# Patient Record
Sex: FEMALE | Race: BLACK OR AFRICAN AMERICAN | NOT HISPANIC OR LATINO | URBAN - METROPOLITAN AREA
[De-identification: names, ages, dates, MRNs, and addresses within clinical notes are randomized per-mention and may not be internally consistent; named-entity substitution may affect disease eponyms.]

---

## 2023-10-08 ENCOUNTER — INPATIENT (INPATIENT)
Facility: HOSPITAL | Age: 68
LOS: 3 days | Discharge: HOME | DRG: 871 | End: 2023-10-12
Attending: GENERAL ACUTE CARE HOSPITAL | Admitting: INTERNAL MEDICINE
Payer: MEDICARE

## 2023-10-08 VITALS
RESPIRATION RATE: 26 BRPM | HEART RATE: 117 BPM | SYSTOLIC BLOOD PRESSURE: 174 MMHG | TEMPERATURE: 98 F | WEIGHT: 173.06 LBS | OXYGEN SATURATION: 90 % | DIASTOLIC BLOOD PRESSURE: 110 MMHG

## 2023-10-08 DIAGNOSIS — E03.9 HYPOTHYROIDISM, UNSPECIFIED: ICD-10-CM

## 2023-10-08 DIAGNOSIS — J44.9 CHRONIC OBSTRUCTIVE PULMONARY DISEASE, UNSPECIFIED: ICD-10-CM

## 2023-10-08 DIAGNOSIS — J45.901 UNSPECIFIED ASTHMA WITH (ACUTE) EXACERBATION: ICD-10-CM

## 2023-10-08 DIAGNOSIS — Z29.9 ENCOUNTER FOR PROPHYLACTIC MEASURES, UNSPECIFIED: ICD-10-CM

## 2023-10-08 DIAGNOSIS — I10 ESSENTIAL (PRIMARY) HYPERTENSION: ICD-10-CM

## 2023-10-08 DIAGNOSIS — F17.200 NICOTINE DEPENDENCE, UNSPECIFIED, UNCOMPLICATED: ICD-10-CM

## 2023-10-08 DIAGNOSIS — J06.9 ACUTE UPPER RESPIRATORY INFECTION, UNSPECIFIED: ICD-10-CM

## 2023-10-08 LAB
ANION GAP SERPL CALC-SCNC: 14 MMOL/L — SIGNIFICANT CHANGE UP (ref 5–17)
BASE EXCESS BLDV CALC-SCNC: -0.6 MMOL/L — SIGNIFICANT CHANGE UP (ref -2–3)
BUN SERPL-MCNC: 6 MG/DL — LOW (ref 7–23)
CA-I SERPL-SCNC: 1.09 MMOL/L — LOW (ref 1.15–1.33)
CALCIUM SERPL-MCNC: 9.7 MG/DL — SIGNIFICANT CHANGE UP (ref 8.4–10.5)
CHLORIDE SERPL-SCNC: 103 MMOL/L — SIGNIFICANT CHANGE UP (ref 96–108)
CO2 BLDV-SCNC: 24.3 MMOL/L — SIGNIFICANT CHANGE UP (ref 22–26)
CO2 SERPL-SCNC: 22 MMOL/L — SIGNIFICANT CHANGE UP (ref 22–31)
CREAT SERPL-MCNC: 0.87 MG/DL — SIGNIFICANT CHANGE UP (ref 0.5–1.3)
EGFR: 74 ML/MIN/1.73M2 — SIGNIFICANT CHANGE UP
GAS PNL BLDV: 134 MMOL/L — LOW (ref 136–145)
GAS PNL BLDV: SIGNIFICANT CHANGE UP
GLUCOSE BLDC GLUCOMTR-MCNC: 374 MG/DL — HIGH (ref 70–99)
GLUCOSE SERPL-MCNC: 140 MG/DL — HIGH (ref 70–99)
HCO3 BLDV-SCNC: 23 MMOL/L — SIGNIFICANT CHANGE UP (ref 22–29)
HCT VFR BLD CALC: 45.5 % — HIGH (ref 34.5–45)
HGB BLD-MCNC: 15.1 G/DL — SIGNIFICANT CHANGE UP (ref 11.5–15.5)
MCHC RBC-ENTMCNC: 31.3 PG — SIGNIFICANT CHANGE UP (ref 27–34)
MCHC RBC-ENTMCNC: 33.2 GM/DL — SIGNIFICANT CHANGE UP (ref 32–36)
MCV RBC AUTO: 94.2 FL — SIGNIFICANT CHANGE UP (ref 80–100)
NRBC # BLD: 0 /100 WBCS — SIGNIFICANT CHANGE UP (ref 0–0)
PCO2 BLDV: 35 MMHG — LOW (ref 39–42)
PH BLDV: 7.43 — SIGNIFICANT CHANGE UP (ref 7.32–7.43)
PLATELET # BLD AUTO: 273 K/UL — SIGNIFICANT CHANGE UP (ref 150–400)
PO2 BLDV: 59 MMHG — HIGH (ref 25–45)
POTASSIUM BLDV-SCNC: 5.1 MMOL/L — SIGNIFICANT CHANGE UP (ref 3.5–5.1)
POTASSIUM SERPL-MCNC: 4.4 MMOL/L — SIGNIFICANT CHANGE UP (ref 3.5–5.3)
POTASSIUM SERPL-SCNC: 4.4 MMOL/L — SIGNIFICANT CHANGE UP (ref 3.5–5.3)
RAPID RVP RESULT: DETECTED
RBC # BLD: 4.83 M/UL — SIGNIFICANT CHANGE UP (ref 3.8–5.2)
RBC # FLD: 13.8 % — SIGNIFICANT CHANGE UP (ref 10.3–14.5)
RV+EV RNA SPEC QL NAA+PROBE: DETECTED
SAO2 % BLDV: 91.4 % — HIGH (ref 67–88)
SARS-COV-2 RNA SPEC QL NAA+PROBE: SIGNIFICANT CHANGE UP
SODIUM SERPL-SCNC: 139 MMOL/L — SIGNIFICANT CHANGE UP (ref 135–145)
WBC # BLD: 6.53 K/UL — SIGNIFICANT CHANGE UP (ref 3.8–10.5)
WBC # FLD AUTO: 6.53 K/UL — SIGNIFICANT CHANGE UP (ref 3.8–10.5)

## 2023-10-08 PROCEDURE — 99291 CRITICAL CARE FIRST HOUR: CPT

## 2023-10-08 PROCEDURE — 71045 X-RAY EXAM CHEST 1 VIEW: CPT | Mod: 26

## 2023-10-08 PROCEDURE — 99222 1ST HOSP IP/OBS MODERATE 55: CPT | Mod: GC

## 2023-10-08 RX ORDER — MAGNESIUM SULFATE 500 MG/ML
2 VIAL (ML) INJECTION ONCE
Refills: 0 | Status: DISCONTINUED | OUTPATIENT
Start: 2023-10-08 | End: 2023-10-08

## 2023-10-08 RX ORDER — ONDANSETRON 8 MG/1
4 TABLET, FILM COATED ORAL EVERY 8 HOURS
Refills: 0 | Status: DISCONTINUED | OUTPATIENT
Start: 2023-10-08 | End: 2023-10-12

## 2023-10-08 RX ORDER — ALBUTEROL 90 UG/1
3 AEROSOL, METERED ORAL
Refills: 0 | DISCHARGE

## 2023-10-08 RX ORDER — ALBUTEROL 90 UG/1
2.5 AEROSOL, METERED ORAL
Refills: 0 | Status: COMPLETED | OUTPATIENT
Start: 2023-10-08 | End: 2023-10-08

## 2023-10-08 RX ORDER — BUDESONIDE AND FORMOTEROL FUMARATE DIHYDRATE 160; 4.5 UG/1; UG/1
2 AEROSOL RESPIRATORY (INHALATION)
Refills: 0 | Status: DISCONTINUED | OUTPATIENT
Start: 2023-10-08 | End: 2023-10-12

## 2023-10-08 RX ORDER — AMLODIPINE BESYLATE 2.5 MG/1
10 TABLET ORAL ONCE
Refills: 0 | Status: COMPLETED | OUTPATIENT
Start: 2023-10-08 | End: 2023-10-08

## 2023-10-08 RX ORDER — ALBUTEROL 90 UG/1
2 AEROSOL, METERED ORAL
Refills: 0 | DISCHARGE

## 2023-10-08 RX ORDER — AMLODIPINE BESYLATE 2.5 MG/1
10 TABLET ORAL DAILY
Refills: 0 | Status: DISCONTINUED | OUTPATIENT
Start: 2023-10-09 | End: 2023-10-12

## 2023-10-08 RX ORDER — LORATADINE 10 MG/1
1 TABLET ORAL
Refills: 0 | DISCHARGE

## 2023-10-08 RX ORDER — PANTOPRAZOLE SODIUM 20 MG/1
40 TABLET, DELAYED RELEASE ORAL
Refills: 0 | Status: DISCONTINUED | OUTPATIENT
Start: 2023-10-08 | End: 2023-10-12

## 2023-10-08 RX ORDER — FLUTICASONE PROPIONATE AND SALMETEROL 50; 250 UG/1; UG/1
1 POWDER ORAL; RESPIRATORY (INHALATION)
Refills: 0 | DISCHARGE

## 2023-10-08 RX ORDER — ATORVASTATIN CALCIUM 80 MG/1
1 TABLET, FILM COATED ORAL
Refills: 0 | DISCHARGE

## 2023-10-08 RX ORDER — ATORVASTATIN CALCIUM 80 MG/1
40 TABLET, FILM COATED ORAL AT BEDTIME
Refills: 0 | Status: DISCONTINUED | OUTPATIENT
Start: 2023-10-08 | End: 2023-10-12

## 2023-10-08 RX ORDER — IPRATROPIUM/ALBUTEROL SULFATE 18-103MCG
3 AEROSOL WITH ADAPTER (GRAM) INHALATION
Refills: 0 | Status: COMPLETED | OUTPATIENT
Start: 2023-10-08 | End: 2023-10-08

## 2023-10-08 RX ORDER — AMLODIPINE BESYLATE 2.5 MG/1
1 TABLET ORAL
Refills: 0 | DISCHARGE

## 2023-10-08 RX ORDER — ACETAMINOPHEN 500 MG
650 TABLET ORAL EVERY 6 HOURS
Refills: 0 | Status: DISCONTINUED | OUTPATIENT
Start: 2023-10-08 | End: 2023-10-12

## 2023-10-08 RX ORDER — LEVOTHYROXINE SODIUM 125 MCG
75 TABLET ORAL DAILY
Refills: 0 | Status: DISCONTINUED | OUTPATIENT
Start: 2023-10-08 | End: 2023-10-12

## 2023-10-08 RX ORDER — IPRATROPIUM/ALBUTEROL SULFATE 18-103MCG
3 AEROSOL WITH ADAPTER (GRAM) INHALATION EVERY 4 HOURS
Refills: 0 | Status: DISCONTINUED | OUTPATIENT
Start: 2023-10-08 | End: 2023-10-11

## 2023-10-08 RX ORDER — INSULIN LISPRO 100/ML
VIAL (ML) SUBCUTANEOUS
Refills: 0 | Status: DISCONTINUED | OUTPATIENT
Start: 2023-10-08 | End: 2023-10-12

## 2023-10-08 RX ORDER — HEPARIN SODIUM 5000 [USP'U]/ML
5000 INJECTION INTRAVENOUS; SUBCUTANEOUS EVERY 8 HOURS
Refills: 0 | Status: DISCONTINUED | OUTPATIENT
Start: 2023-10-08 | End: 2023-10-08

## 2023-10-08 RX ORDER — ENOXAPARIN SODIUM 100 MG/ML
40 INJECTION SUBCUTANEOUS EVERY 24 HOURS
Refills: 0 | Status: DISCONTINUED | OUTPATIENT
Start: 2023-10-08 | End: 2023-10-12

## 2023-10-08 RX ORDER — ASPIRIN/CALCIUM CARB/MAGNESIUM 324 MG
1 TABLET ORAL
Refills: 0 | DISCHARGE

## 2023-10-08 RX ORDER — INSULIN LISPRO 100/ML
VIAL (ML) SUBCUTANEOUS AT BEDTIME
Refills: 0 | Status: DISCONTINUED | OUTPATIENT
Start: 2023-10-08 | End: 2023-10-12

## 2023-10-08 RX ORDER — CYCLOBENZAPRINE HYDROCHLORIDE 10 MG/1
1 TABLET, FILM COATED ORAL
Refills: 0 | DISCHARGE

## 2023-10-08 RX ORDER — LANOLIN ALCOHOL/MO/W.PET/CERES
3 CREAM (GRAM) TOPICAL AT BEDTIME
Refills: 0 | Status: DISCONTINUED | OUTPATIENT
Start: 2023-10-08 | End: 2023-10-12

## 2023-10-08 RX ORDER — OMEPRAZOLE 10 MG/1
1 CAPSULE, DELAYED RELEASE ORAL
Refills: 0 | DISCHARGE

## 2023-10-08 RX ORDER — MAGNESIUM SULFATE 500 MG/ML
2 VIAL (ML) INJECTION ONCE
Refills: 0 | Status: COMPLETED | OUTPATIENT
Start: 2023-10-08 | End: 2023-10-08

## 2023-10-08 RX ORDER — LEVOTHYROXINE SODIUM 125 MCG
1 TABLET ORAL
Refills: 0 | DISCHARGE

## 2023-10-08 RX ADMIN — ALBUTEROL 2.5 MILLIGRAM(S): 90 AEROSOL, METERED ORAL at 17:49

## 2023-10-08 RX ADMIN — Medication 3 MILLILITER(S): at 15:07

## 2023-10-08 RX ADMIN — ALBUTEROL 2.5 MILLIGRAM(S): 90 AEROSOL, METERED ORAL at 16:27

## 2023-10-08 RX ADMIN — Medication 3 MILLILITER(S): at 22:34

## 2023-10-08 RX ADMIN — AMLODIPINE BESYLATE 10 MILLIGRAM(S): 2.5 TABLET ORAL at 17:53

## 2023-10-08 RX ADMIN — Medication 2 GRAM(S): at 16:43

## 2023-10-08 RX ADMIN — Medication 150 GRAM(S): at 15:19

## 2023-10-08 RX ADMIN — Medication 3 MILLILITER(S): at 19:34

## 2023-10-08 RX ADMIN — ALBUTEROL 2.5 MILLIGRAM(S): 90 AEROSOL, METERED ORAL at 17:55

## 2023-10-08 RX ADMIN — ATORVASTATIN CALCIUM 40 MILLIGRAM(S): 80 TABLET, FILM COATED ORAL at 22:35

## 2023-10-08 RX ADMIN — Medication 3 MILLILITER(S): at 15:05

## 2023-10-08 RX ADMIN — ENOXAPARIN SODIUM 40 MILLIGRAM(S): 100 INJECTION SUBCUTANEOUS at 22:35

## 2023-10-08 RX ADMIN — ALBUTEROL 2.5 MILLIGRAM(S): 90 AEROSOL, METERED ORAL at 18:30

## 2023-10-08 RX ADMIN — ALBUTEROL 2.5 MILLIGRAM(S): 90 AEROSOL, METERED ORAL at 16:43

## 2023-10-08 RX ADMIN — ALBUTEROL 2.5 MILLIGRAM(S): 90 AEROSOL, METERED ORAL at 16:26

## 2023-10-08 RX ADMIN — Medication 125 MILLIGRAM(S): at 15:08

## 2023-10-08 RX ADMIN — Medication 6: at 23:15

## 2023-10-08 NOTE — ED PROVIDER NOTE - PROGRESS NOTE DETAILS
Pt w some improvement in bs after meds, dec wob w bipap, sat 100%.  CXR neg, labs wnl.  RVP pending.  ICU consulted for poss step down bed.  Will cont to monitor.  Plan for admit. Pt off bipap, cont wheezing but decreased wob, sat 95% ra.  Pt c/o chest tightness, improving after additional neb.  Pt did not take her meds x 2 d 2/2 feeling unwell.  Amlodipine 10 ordered for pt.  ICU dispo pending. Pt accepted for step down.

## 2023-10-08 NOTE — H&P ADULT - ASSESSMENT
67F PMHx asthma (no intubations, last admit many yrs ago, last steroids 2 yr ago), lung nodules, active smoker, HTN, HLD, Hypothyroid p/w 3d hx sob/wheezing/dry cough/rhinorrhea/nasal congestion iso asthma exacerbation 2/2 viral respiratory infection

## 2023-10-08 NOTE — ED PROVIDER NOTE - PHYSICAL EXAMINATION
VITAL SIGNS: I have reviewed nursing notes and confirm.  CONSTITUTIONAL:  in resp distress.  speaking full sentences, audible wheezing, + inc wob, tachypnea  SKIN:  warm and dry, no acute rash.   HEAD:  normocephalic, atraumatic.  EYES: PERRL, EOM intact; conjunctiva and sclera clear.  ENT: No nasal discharge; airway clear.   NECK: Supple; non tender.  CARD: S1, S2 normal; no murmurs, gallops, or rubs. Rapid, regular rate and rhythm.   RESP:  diffuse wheezes, distant bs w poor air movement  ABD: Normal bowel sounds; soft; non-distended; non-tender; no guarding/ rebound.  MSK: Normal ROM. No clubbing, cyanosis or edema. no ttp bilat le  NEURO: Alert, oriented, grossly unremarkable  PSYCH: Cooperative, mood and affect appropriate.

## 2023-10-08 NOTE — ED ADULT NURSE NOTE - OBJECTIVE STATEMENT
BIBEMS from  for c/o sob+wheezing x 3 days, with rhinorrhea+cough, pmh asthma, no releif with at home nebs, similar symptoms in grandson. Denies CP/weakness/dizziness/fevers/chills.     On assessment- AOx4,, VS- tachy and hypoxic and tachypneic, able to speak in clear coherent sentences despite VS with sig acessory muscle use, apparent steady gait unassisted, neuro intact with no apparent facial asymmetry, PERRLA.    Pt upgraded from triage with immediate ED RN and MD team at bedside. Placed on CM, EKG obtained, PIV established with labs sent. Medicated per verbal orders as reflected in MAR. Pt remains on CM, resting on stretcher, stable. BIPAP applied by resp team

## 2023-10-08 NOTE — ED PROVIDER NOTE - CARE PLAN
Principal Discharge DX:	Acute asthma exacerbation   1 Principal Discharge DX:	Acute asthma exacerbation  Secondary Diagnosis:	Enterovirus infection  Secondary Diagnosis:	Rhinovirus infection

## 2023-10-08 NOTE — H&P ADULT - ATTENDING COMMENTS
Asthma, HTN, smoker with acute asthma exacerbation with rhinovirus  physical as above  still dyspneic off BIPAP after steroids and bronchodilators  admit to telemetry  continue steroids and bronchodilators  NIV on standby  continue amlodipine

## 2023-10-08 NOTE — H&P ADULT - PROBLEM SELECTOR PLAN 6
1/2 PPD since 18 y/o, total of 48 pack year smoking hx    - Pt refusing nicotine replacement therapy at this time

## 2023-10-08 NOTE — H&P ADULT - PROBLEM SELECTOR PLAN 1
#COPD exacerbation  No intubations, last admit many yrs ago, last steroids 2 yr ago, home med Advair-diskus 500-50 Q12 and Albuterol inhaler PRN. P/w 3 d hx sob/wheezing/dry cough/rhinorrhea/nasal congestion 2/2 viral respiratory infection. Solumedrol, duonebs in ED    - Duonebs Q4 standing  - C/w home advair disku 500-50 Q12  - Prednisone 40 Qd for 5 day course  - mISS and PPI while on Pred  - If WOB increases and pt desaturates, place pt back on Bipap #COPD exacerbation  No intubations, last admit many yrs ago, last steroids 2 yr ago, home med Advair-diskus 500-50 Q12 and Albuterol inhaler PRN. P/w 3 d hx sob/wheezing/dry cough/rhinorrhea/nasal congestion 2/2 viral respiratory infection. Solumedrol, duonebs in ED    - Duonebs Q4 standing  - C/w home advair disku 500-50 Q12 (Therapeutic exchange)  - Prednisone 40 Qd for 5 day course  - mISS and PPI while on Pred  - If WOB increases and pt desaturates, place pt back on Bipap

## 2023-10-08 NOTE — H&P ADULT - HISTORY OF PRESENT ILLNESS
67F PMHx asthma (no intubations, last admit many yrs ago, last steroids 2 yr ago), lung nodules, active smoker, HTN, HLD, Hypothyroid p/w 3d hx sob/wheezing/dry cough/rhinorrhea/nasal congestion, pt was sent from urgent care for resp distress and ra sat 87%.  Pt given 1 duoneb and 40 mg prednisone and has been using home mdi and neb machine w/o relief.  Grandson sick w similar sx, potentailly viral etiology.   Pt reports hx covid 2 mo ago, sx improved. Pt also reports chest tightness similar to prior asthma. No recent travel, sick contact of shant as above.     ED Vitals: T98.2F, , /110, SpO2 90 on RA with RR 26 --> SpO2  ED Labs: WBC 6.5, Hgb 15, Plt 273, CMP WNL except glucose 140. . VBG pCO2 low at 35, PO2 high at 59.   ED studies: RVP positive for rhinovirus. Covid negative. CXR performed pending read, no overt consolidations noted by author.   ED interventions: Amlodipine 10 x1, Mag sulfate 2g x1, Solumedrol 125 IVP x1, Duoneb x1, Albulterol neb x2. Initially placed on bipap for iWOB, weaned to NC then room air. Saturating well, no iWOB however still wheezing.

## 2023-10-08 NOTE — ED ADULT NURSE NOTE - CHIEF COMPLAINT QUOTE
" I have been sick for the last 3 days." +wheezing, +SOB. +CP +cough +decreased PO + nausea Denies fever. Hx COPD and asthma. pt reports taking albuterol tx at home w no relief. pt was brought by EMS from [facility] where she received prednisone and albuterol treatment.

## 2023-10-08 NOTE — ED PROVIDER NOTE - CLINICAL SUMMARY MEDICAL DECISION MAKING FREE TEXT BOX
Pt w acute asthma exacerbation, likely 2/2 uri.  Pt s/p neb x 1 and 40 mg prednisone w/o much effect.  Bipap, solumedrol, magnesium, nebs ordered  Plan labs, cxr, rvp.  RA sat 90-96% here but 87% at urgent care pta.  Likely admit; reassess. See progress notes for further mdm related documentation.

## 2023-10-08 NOTE — ED ADULT TRIAGE NOTE - CHIEF COMPLAINT QUOTE
" I have been sick for the last 3 days." +wheezing, +SOB. +CP +cough +decreased PO + nausea Denies fever. Hx COPD and albuterol. pt reports taking asthma tx at home w no relief. pt was brought by EMS from urgent[facility] where she received prednisone and albuterol treatment. " I have been sick for the last 3 days." +wheezing, +SOB. +CP +cough +decreased PO + nausea Denies fever. Hx COPD and asthma. pt reports taking albuterol tx at home w no relief. pt was brought by EMS from urgentcare where she received prednisone and albuterol treatment.

## 2023-10-08 NOTE — H&P ADULT - NSHPREVIEWOFSYSTEMS_GEN_ALL_CORE
Patient denies h/n/v/d, fever, chills, cp, palpitations, sob, abd pain, leg swelling, rashes, dysuria, and changes in BM.  Pt reports chest tightness that feels like her asthma exacerbations in the past.

## 2023-10-08 NOTE — H&P ADULT - NSHPLABSRESULTS_GEN_ALL_CORE
LABS:                        15.1   6.53  )-----------( 273      ( 08 Oct 2023 15:02 )             45.5     10-08    139  |  103  |  6<L>  ----------------------------<  140<H>  4.4   |  22  |  0.87    Ca    9.7      08 Oct 2023 15:02        Urinalysis Basic - ( 08 Oct 2023 15:02 )    Color: x / Appearance: x / SG: x / pH: x  Gluc: 140 mg/dL / Ketone: x  / Bili: x / Urobili: x   Blood: x / Protein: x / Nitrite: x   Leuk Esterase: x / RBC: x / WBC x   Sq Epi: x / Non Sq Epi: x / Bacteria: x

## 2023-10-08 NOTE — H&P ADULT - PROBLEM SELECTOR PLAN 2
- Duonebs Q4 standing  - C/w home advair disku 500-50 Q12  - Prednisone 40 Qd for 5 day course  - mISS and PPI while on Pred  - If WOB increases and pt desaturates, place pt back on Bipap

## 2023-10-08 NOTE — H&P ADULT - NSHPPHYSICALEXAM_GEN_ALL_CORE
General: sitting up in bed in NAD, does not appear in distress, able to converse without difficulty, 6LNC   HEENT: NC/AT; PERRL, anicteric sclera; MMM  Neck: supple  Cardiovascular: +S1/S2, RRR  Respiratory: decrease airflow, mild wheezing b/l  Gastrointestinal: soft, NT/ND; +BSx4  Extremities: WWP; no edema, clubbing or cyanosis  Vascular: 2+ radial pulses B/L  Neurological: AAOx3; no focal deficits  Dermatologic: no appreciable wounds or damage to the skin

## 2023-10-08 NOTE — CONSULT NOTE ADULT - ASSESSMENT
67F PMH asthma/COPD (not on home O2, no prior intubations), HTN, HLD, hypothyroidism current active smoker presenting to ED with 3-4 days of SOB and chest tightness c/f asthma exacerbation. ICU consulted for management of acute asthma exacerbation.    NEURO  -AAOx3, SOO    CARDIAC  #HTN  -c/w home amlodipine   -monitor BPs     #HLD  -c/w home atorvastatin 40mg qd     RESP/PULM  #asthma/COPD  -history of asthma/COPD with recent onset of URI symptoms triggering worsening SOB, chest tightness and productive cough c/f asthma exacerbation   -never intubated   -actively wheezing on exam  -s/p bipap, Mg, solumedrol 125mg IVP x1, 3 rounds duonebs and 3 rounds albuterol nebs with improvement in symptoms  -RVP positive for entero/rhinovirus, likely trigger   -c/w supplemental O2, wean as tolerated  -prednisone 40mg qd x5 days  -duonebs q4hrs standing, wean as tolerated  -c/w home advair diskus inhalers   - on smoking cessation   -PPI while on steroids  -monitor sugars while on steroids   -patient offered nicotine patch but declined    GI  -c/w home PPI     RENAL   -SOO    ENDO   -c/w home synthroid 75mcg qd    ID  #entero/rhinovirus  -afebrile, no leukocytosis, does not meet sepsis criteria   -supportive care  -robitussin prn cough   -tylenol prn fevers     PROPHYLAXIS  F: none  E: replete as needed  N: DASH diet  DVT ppx: lovenox   Dispo: 7 [facility]

## 2023-10-08 NOTE — ED PROVIDER NOTE - NSICDXPASTMEDICALHX_GEN_ALL_CORE_FT
PAST MEDICAL HISTORY:  Asthma     GERD (gastroesophageal reflux disease)     HLD (hyperlipidemia)     HTN (hypertension)     Hypothyroid     Lung nodules

## 2023-10-08 NOTE — ED PROVIDER NOTE - OBJECTIVE STATEMENT
66 yo F h/o asthma (no intubations, last admit many yrs ago, last steroids 2 yr ago), lung nodules, tob abuse, htn, hld, hypothyroid c/o 2-3 d increased sob/wheezing, dry cough, rhinorrhea, nasal congestion sent from urgent care for resp distress and ra sat 87%.  Pt given 1 duoneb and 40 mg prednisone and has been using home mdi and neb machine w/o relief.  Grandson sick w similar.  Pt reports covid ~ 2 mo ago - sx improved.  + chest tightness similar to prior asthma, o/w no cp.  No palpitations, le edema, travel.

## 2023-10-08 NOTE — CONSULT NOTE ADULT - SUBJECTIVE AND OBJECTIVE BOX
HPI: 67F PMH asthma/COPD (not on home O2, no prior intubations), HTN, HLD, hypothyroidism current active smoker presenting to ED with 3-4 days of SOB and chest tightness c/f asthma exacerbation. Patient reports 4 days ago she began to experience runny nose, sore throat and cough productive of whitish sputum. Then 3 days ago started to experience SOB with wheezing and chest tightness. She has been using her home inhalers and nebulizers without relief. Symptoms progressively worsened prompting her to come to to ED. Recent sick contact her grandson as he recently had the flu and he lives with her. Also admits to nausea. Otherwise denies fevers, chills, chest pain, abd pain, nausea/vomiting/diarrhea, dysuria, hematuria, extremity pain or swelling. Last hospitalization for asthma exacerbation 5 years ago, has never been intubated. Smokes 1/2 PPD since 19 years old. Smokes marijuana daily.     Home meds:   asa 81mg qd  advair diskus inhaler 500-50mg 1 puff BID  albuterol inhaler and nebulizer q6 prn   synthroid 75mcg qd  loratadine 10mg qd  amlodipine 20mg qd  atorvastatin 40mg qd  omeprazole 40mg qd     In the ED patient not hypoxic however placed on bipap for increased WOB. S/p 3 rounds duonebs, 3 rounds albuterol nebs, 125mg IV solumedrol and IV Mg in ED.     PHYSICAL EXAM:    General: sitting up in bed in NAD  HEENT: NC/AT; PERRL, anicteric sclera; MMM  Neck: supple  Cardiovascular: +S1/S2, RRR  Respiratory: diffuse wheezes b/l, comfortable on bipap-->2LNC   Gastrointestinal: soft, NT/ND; +BSx4  Extremities: WWP; no edema, clubbing or cyanosis  Vascular: 2+ radial pulses B/L  Neurological: AAOx3; no focal deficits  Dermatologic: no appreciable wounds or damage to the skin    VITAL SIGNS:  Vital Signs Last 24 Hrs  T(C): 36.8 (08 Oct 2023 14:44), Max: 36.8 (08 Oct 2023 14:44)  T(F): 98.2 (08 Oct 2023 14:44), Max: 98.2 (08 Oct 2023 14:44)  HR: 110 (08 Oct 2023 18:52) (97 - 117)  BP: 167/83 (08 Oct 2023 18:52) (167/83 - 174/110)  BP(mean): --  RR: 20 (08 Oct 2023 18:52) (20 - 26)  SpO2: 96% (08 Oct 2023 18:52) (90% - 99%)    Parameters below as of 08 Oct 2023 18:52  Patient On (Oxygen Delivery Method): nasal cannula  O2 Flow (L/min): 2        MEDICATIONS:  MEDICATIONS  (STANDING):  albuterol/ipratropium for Nebulization 3 milliLiter(s) Nebulizer every 4 hours  atorvastatin 40 milliGRAM(s) Oral at bedtime  budesonide 160 MICROgram(s)/formoterol 4.5 MICROgram(s) Inhaler 2 Puff(s) Inhalation two times a day  heparin   Injectable 5000 Unit(s) SubCutaneous every 8 hours  levothyroxine 75 MICROGram(s) Oral daily  pantoprazole    Tablet 40 milliGRAM(s) Oral before breakfast    MEDICATIONS  (PRN):  acetaminophen     Tablet .. 650 milliGRAM(s) Oral every 6 hours PRN Temp greater or equal to 38C (100.4F), Mild Pain (1 - 3)  aluminum hydroxide/magnesium hydroxide/simethicone Suspension 30 milliLiter(s) Oral every 4 hours PRN Dyspepsia  melatonin 3 milliGRAM(s) Oral at bedtime PRN Insomnia  ondansetron Injectable 4 milliGRAM(s) IV Push every 8 hours PRN Nausea and/or Vomiting      ALLERGIES:  Allergies    peanuts (Unknown)  No Known Drug Allergies    Intolerances        LABS:                        15.1   6.53  )-----------( 273      ( 08 Oct 2023 15:02 )             45.5     10-08    139  |  103  |  6<L>  ----------------------------<  140<H>  4.4   |  22  |  0.87    Ca    9.7      08 Oct 2023 15:02        Urinalysis Basic - ( 08 Oct 2023 15:02 )    Color: x / Appearance: x / SG: x / pH: x  Gluc: 140 mg/dL / Ketone: x  / Bili: x / Urobili: x   Blood: x / Protein: x / Nitrite: x   Leuk Esterase: x / RBC: x / WBC x   Sq Epi: x / Non Sq Epi: x / Bacteria: x      CAPILLARY BLOOD GLUCOSE          RADIOLOGY & ADDITIONAL TESTS: Reviewed.

## 2023-10-09 LAB
A1C WITH ESTIMATED AVERAGE GLUCOSE RESULT: 7 % — HIGH (ref 4–5.6)
ALBUMIN SERPL ELPH-MCNC: 4 G/DL — SIGNIFICANT CHANGE UP (ref 3.3–5)
ALP SERPL-CCNC: 111 U/L — SIGNIFICANT CHANGE UP (ref 40–120)
ALT FLD-CCNC: 15 U/L — SIGNIFICANT CHANGE UP (ref 10–45)
ANION GAP SERPL CALC-SCNC: 11 MMOL/L — SIGNIFICANT CHANGE UP (ref 5–17)
AST SERPL-CCNC: 20 U/L — SIGNIFICANT CHANGE UP (ref 10–40)
BASOPHILS # BLD AUTO: 0.02 K/UL — SIGNIFICANT CHANGE UP (ref 0–0.2)
BASOPHILS NFR BLD AUTO: 0.3 % — SIGNIFICANT CHANGE UP (ref 0–2)
BILIRUB SERPL-MCNC: 0.3 MG/DL — SIGNIFICANT CHANGE UP (ref 0.2–1.2)
BUN SERPL-MCNC: 14 MG/DL — SIGNIFICANT CHANGE UP (ref 7–23)
CALCIUM SERPL-MCNC: 9.8 MG/DL — SIGNIFICANT CHANGE UP (ref 8.4–10.5)
CHLORIDE SERPL-SCNC: 105 MMOL/L — SIGNIFICANT CHANGE UP (ref 96–108)
CO2 SERPL-SCNC: 22 MMOL/L — SIGNIFICANT CHANGE UP (ref 22–31)
CREAT SERPL-MCNC: 0.89 MG/DL — SIGNIFICANT CHANGE UP (ref 0.5–1.3)
EGFR: 72 ML/MIN/1.73M2 — SIGNIFICANT CHANGE UP
EOSINOPHIL # BLD AUTO: 0 K/UL — SIGNIFICANT CHANGE UP (ref 0–0.5)
EOSINOPHIL NFR BLD AUTO: 0 % — SIGNIFICANT CHANGE UP (ref 0–6)
ESTIMATED AVERAGE GLUCOSE: 154 MG/DL — HIGH (ref 68–114)
GLUCOSE BLDC GLUCOMTR-MCNC: 120 MG/DL — HIGH (ref 70–99)
GLUCOSE BLDC GLUCOMTR-MCNC: 150 MG/DL — HIGH (ref 70–99)
GLUCOSE BLDC GLUCOMTR-MCNC: 173 MG/DL — HIGH (ref 70–99)
GLUCOSE BLDC GLUCOMTR-MCNC: 232 MG/DL — HIGH (ref 70–99)
GLUCOSE SERPL-MCNC: 184 MG/DL — HIGH (ref 70–99)
HCT VFR BLD CALC: 42.1 % — SIGNIFICANT CHANGE UP (ref 34.5–45)
HCV AB S/CO SERPL IA: 0.04 S/CO — SIGNIFICANT CHANGE UP
HCV AB SERPL-IMP: SIGNIFICANT CHANGE UP
HGB BLD-MCNC: 14 G/DL — SIGNIFICANT CHANGE UP (ref 11.5–15.5)
IMM GRANULOCYTES NFR BLD AUTO: 1.1 % — HIGH (ref 0–0.9)
LYMPHOCYTES # BLD AUTO: 1.13 K/UL — SIGNIFICANT CHANGE UP (ref 1–3.3)
LYMPHOCYTES # BLD AUTO: 15.9 % — SIGNIFICANT CHANGE UP (ref 13–44)
MAGNESIUM SERPL-MCNC: 2.5 MG/DL — SIGNIFICANT CHANGE UP (ref 1.6–2.6)
MCHC RBC-ENTMCNC: 31.4 PG — SIGNIFICANT CHANGE UP (ref 27–34)
MCHC RBC-ENTMCNC: 33.3 GM/DL — SIGNIFICANT CHANGE UP (ref 32–36)
MCV RBC AUTO: 94.4 FL — SIGNIFICANT CHANGE UP (ref 80–100)
MONOCYTES # BLD AUTO: 0.66 K/UL — SIGNIFICANT CHANGE UP (ref 0–0.9)
MONOCYTES NFR BLD AUTO: 9.3 % — SIGNIFICANT CHANGE UP (ref 2–14)
NEUTROPHILS # BLD AUTO: 5.22 K/UL — SIGNIFICANT CHANGE UP (ref 1.8–7.4)
NEUTROPHILS NFR BLD AUTO: 73.4 % — SIGNIFICANT CHANGE UP (ref 43–77)
NRBC # BLD: 0 /100 WBCS — SIGNIFICANT CHANGE UP (ref 0–0)
PHOSPHATE SERPL-MCNC: 3 MG/DL — SIGNIFICANT CHANGE UP (ref 2.5–4.5)
PLATELET # BLD AUTO: 250 K/UL — SIGNIFICANT CHANGE UP (ref 150–400)
POTASSIUM SERPL-MCNC: 4.3 MMOL/L — SIGNIFICANT CHANGE UP (ref 3.5–5.3)
POTASSIUM SERPL-SCNC: 4.3 MMOL/L — SIGNIFICANT CHANGE UP (ref 3.5–5.3)
PROCALCITONIN SERPL-MCNC: 0.05 NG/ML — SIGNIFICANT CHANGE UP (ref 0.02–0.1)
PROT SERPL-MCNC: 7.3 G/DL — SIGNIFICANT CHANGE UP (ref 6–8.3)
RBC # BLD: 4.46 M/UL — SIGNIFICANT CHANGE UP (ref 3.8–5.2)
RBC # FLD: 13.7 % — SIGNIFICANT CHANGE UP (ref 10.3–14.5)
SODIUM SERPL-SCNC: 138 MMOL/L — SIGNIFICANT CHANGE UP (ref 135–145)
WBC # BLD: 7.11 K/UL — SIGNIFICANT CHANGE UP (ref 3.8–10.5)
WBC # FLD AUTO: 7.11 K/UL — SIGNIFICANT CHANGE UP (ref 3.8–10.5)

## 2023-10-09 PROCEDURE — 99223 1ST HOSP IP/OBS HIGH 75: CPT | Mod: GC

## 2023-10-09 PROCEDURE — 99232 SBSQ HOSP IP/OBS MODERATE 35: CPT | Mod: GC

## 2023-10-09 RX ADMIN — PANTOPRAZOLE SODIUM 40 MILLIGRAM(S): 20 TABLET, DELAYED RELEASE ORAL at 06:01

## 2023-10-09 RX ADMIN — Medication 3 MILLILITER(S): at 12:40

## 2023-10-09 RX ADMIN — Medication 3 MILLILITER(S): at 09:17

## 2023-10-09 RX ADMIN — BUDESONIDE AND FORMOTEROL FUMARATE DIHYDRATE 2 PUFF(S): 160; 4.5 AEROSOL RESPIRATORY (INHALATION) at 10:22

## 2023-10-09 RX ADMIN — Medication 75 MICROGRAM(S): at 06:01

## 2023-10-09 RX ADMIN — BUDESONIDE AND FORMOTEROL FUMARATE DIHYDRATE 2 PUFF(S): 160; 4.5 AEROSOL RESPIRATORY (INHALATION) at 21:13

## 2023-10-09 RX ADMIN — ATORVASTATIN CALCIUM 40 MILLIGRAM(S): 80 TABLET, FILM COATED ORAL at 21:16

## 2023-10-09 RX ADMIN — Medication 40 MILLIGRAM(S): at 16:02

## 2023-10-09 RX ADMIN — Medication 2: at 06:18

## 2023-10-09 RX ADMIN — BUDESONIDE AND FORMOTEROL FUMARATE DIHYDRATE 2 PUFF(S): 160; 4.5 AEROSOL RESPIRATORY (INHALATION) at 01:35

## 2023-10-09 RX ADMIN — Medication 3 MILLILITER(S): at 21:16

## 2023-10-09 RX ADMIN — Medication 3 MILLILITER(S): at 06:02

## 2023-10-09 RX ADMIN — Medication 3 MILLILITER(S): at 01:36

## 2023-10-09 RX ADMIN — ENOXAPARIN SODIUM 40 MILLIGRAM(S): 100 INJECTION SUBCUTANEOUS at 21:25

## 2023-10-09 RX ADMIN — Medication 3 MILLILITER(S): at 17:30

## 2023-10-09 NOTE — PATIENT PROFILE ADULT - FALL HARM RISK - RISK INTERVENTIONS
Assistance OOB with selected safe patient handling equipment/Communicate Fall Risk and Risk Factors to all staff, patient, and family/Reinforce activity limits and safety measures with patient and family/Visual Cue: Yellow wristband/Bed in lowest position, wheels locked, appropriate side rails in place/Call bell, personal items and telephone in reach/Instruct patient to call for assistance before getting out of bed or chair/Non-slip footwear when patient is out of bed/[place] to call system/Physically safe environment - no spills, clutter or unnecessary equipment/Purposeful Proactive Rounding/Room/bathroom lighting operational, light cord in reach

## 2023-10-09 NOTE — PROGRESS NOTE ADULT - PROBLEM SELECTOR PLAN 1
#COPD exacerbation  No intubations, last admit many yrs ago, last steroids 2 yr ago, home med Advair-diskus 500-50 Q12 and Albuterol inhaler PRN. P/w 3 d hx sob/wheezing/dry cough/rhinorrhea/nasal congestion 2/2 viral respiratory infection. Solumedrol, duonebs in ED      - Duonebs Q4 standing  - C/w home advair disku 500-50 Q12 (Therapeutic exchange)  - Prednisone 40 Qd for 5 day course  - mISS and PPI while on Pred  - If WOB increases and pt desaturates, place pt back on Bipap #COPD exacerbation  No intubations, last admit many yrs ago, last steroids 2 yr ago, home med Advair-diskus 500-50 Q12 and Albuterol inhaler PRN. P/w 3 d hx sob/wheezing/dry cough/rhinorrhea/nasal congestion 2/2 viral respiratory infection. Solumedrol, rubioonezeke in ED. Sees Dr. Estuardo Tay (pulmonologist)      - Duonebs Q4 standing  - C/w home advair disku 500-50 Q12 (Therapeutic exchange)  - Prednisone 40 Qd for 5 day course  - mISS and PPI while on Pred  - check peak flow   - If WOB increases and pt desaturates, place pt back on Bipap

## 2023-10-09 NOTE — PROGRESS NOTE ADULT - SUBJECTIVE AND OBJECTIVE BOX
****ACCEPTANCE FROM [facility] TO [facility]****    Hospital course: 67F PMHx asthma (no intubations, last admit many yrs ago, last steroids 2 yr ago), lung nodules, active smoker, HTN, HLD, Hypothyroid p/w 3d hx sob/wheezing/dry cough/rhinorrhea/nasal congestion iso asthma exacerbation 2/2 viral respiratory infection (RVP +). Pt s/paAmlodipine 10 x1, Mag sulfate 2g x1, Solumedrol 125 IVP x1, Duoneb x1, Albulterol neb x2. Initially placed on bipap for iWOB, weaned to NC. Saturating well, no iWOB however still wheezing. Pt saturating well and stable on 2L NC. Pt transferred from [facility] to [facility] for continued management.       OVERNIGHT EVENTS:    SUBJECTIVE / INTERVAL HPI: Patient seen and examined at bedside.     VITAL SIGNS:  Vital Signs Last 24 Hrs  T(C): 36.6 (09 Oct 2023 17:20), Max: 36.8 (09 Oct 2023 05:05)  T(F): 97.9 (09 Oct 2023 17:20), Max: 98.2 (09 Oct 2023 05:05)  HR: 98 (09 Oct 2023 17:20) (66 - 107)  BP: 171/81 (09 Oct 2023 17:20) (119/87 - 175/83)  BP(mean): 116 (09 Oct 2023 12:40) (99 - 117)  RR: 18 (09 Oct 2023 17:20) (18 - 43)  SpO2: 96% (09 Oct 2023 17:20) (93% - 100%)    Parameters below as of 09 Oct 2023 13:22  Patient On (Oxygen Delivery Method): nasal cannula  O2 Flow (L/min): 2      PHYSICAL EXAM:    General: NAD  HEENT: NC/AT; PERRL, anicteric sclera; MMM  Neck: supple w/o palpable nodularity  Cardiovascular: +S1/S2; RRR  Respiratory: CTA B/L; no W/R/R  Gastrointestinal: soft, NT/ND; +BSx4  Extremities: WWP; no edema, clubbing or cyanosis  Vascular: 2+ radial, DP/PT pulses B/L  Neurological: AAOx3; no focal deficits    MEDICATIONS:  MEDICATIONS  (STANDING):  albuterol/ipratropium for Nebulization 3 milliLiter(s) Nebulizer every 4 hours  amLODIPine   Tablet 10 milliGRAM(s) Oral daily  atorvastatin 40 milliGRAM(s) Oral at bedtime  budesonide 160 MICROgram(s)/formoterol 4.5 MICROgram(s) Inhaler 2 Puff(s) Inhalation two times a day  enoxaparin Injectable 40 milliGRAM(s) SubCutaneous every 24 hours  insulin lispro (ADMELOG) corrective regimen sliding scale   SubCutaneous three times a day before meals  insulin lispro (ADMELOG) corrective regimen sliding scale   SubCutaneous at bedtime  levothyroxine 75 MICROGram(s) Oral daily  pantoprazole    Tablet 40 milliGRAM(s) Oral before breakfast  predniSONE   Tablet 40 milliGRAM(s) Oral every 24 hours    MEDICATIONS  (PRN):  acetaminophen     Tablet .. 650 milliGRAM(s) Oral every 6 hours PRN Temp greater or equal to 38C (100.4F), Mild Pain (1 - 3)  aluminum hydroxide/magnesium hydroxide/simethicone Suspension 30 milliLiter(s) Oral every 4 hours PRN Dyspepsia  melatonin 3 milliGRAM(s) Oral at bedtime PRN Insomnia  ondansetron Injectable 4 milliGRAM(s) IV Push every 8 hours PRN Nausea and/or Vomiting      ALLERGIES:  Allergies    peanuts (Unknown)  No Known Drug Allergies    Intolerances        LABS:                        14.0   7.11  )-----------( 250      ( 09 Oct 2023 05:30 )             42.1     10-09    138  |  105  |  14  ----------------------------<  184<H>  4.3   |  22  |  0.89    Ca    9.8      09 Oct 2023 05:30  Phos  3.0     10-09  Mg     2.5     10-09    TPro  7.3  /  Alb  4.0  /  TBili  0.3  /  DBili  x   /  AST  20  /  ALT  15  /  AlkPhos  111  10-09      Urinalysis Basic - ( 09 Oct 2023 05:30 )    Color: x / Appearance: x / SG: x / pH: x  Gluc: 184 mg/dL / Ketone: x  / Bili: x / Urobili: x   Blood: x / Protein: x / Nitrite: x   Leuk Esterase: x / RBC: x / WBC x   Sq Epi: x / Non Sq Epi: x / Bacteria: x      CAPILLARY BLOOD GLUCOSE      POCT Blood Glucose.: 150 mg/dL (09 Oct 2023 16:11)      RADIOLOGY & ADDITIONAL TESTS: Reviewed.   ****ACCEPTANCE FROM [facility] TO [facility]****    Hospital course: 67F PMHx asthma (no intubations, last admit many yrs ago, last steroids 2 yr ago), lung nodules, active smoker, HTN, HLD, Hypothyroid p/w 3d hx sob/wheezing/dry cough/rhinorrhea/nasal congestion iso asthma exacerbation 2/2 viral respiratory infection (RVP +). Pt s/paAmlodipine 10 x1, Mag sulfate 2g x1, Solumedrol 125 IVP x1, Duoneb x1, Albulterol neb x2. Initially placed on bipap for iWOB, weaned to NC. Saturating well, no iWOB however still wheezing. Pt saturating well and stable on 2L NC. Pt transferred from [facility] to [facility] for continued management.       SUBJECTIVE / INTERVAL HPI: Patient seen and examined at bedside. She endorses feeling SOB and wheezy but feels better than earlier. She says she had not yet received her inhaler since being brought down to [facility]. Denies F/C/N/V/CP/dizziness.     VITAL SIGNS:  Vital Signs Last 24 Hrs  T(C): 36.6 (09 Oct 2023 17:20), Max: 36.8 (09 Oct 2023 05:05)  T(F): 97.9 (09 Oct 2023 17:20), Max: 98.2 (09 Oct 2023 05:05)  HR: 98 (09 Oct 2023 17:20) (66 - 107)  BP: 171/81 (09 Oct 2023 17:20) (119/87 - 175/83)  BP(mean): 116 (09 Oct 2023 12:40) (99 - 117)  RR: 18 (09 Oct 2023 17:20) (18 - 43)  SpO2: 96% (09 Oct 2023 17:20) (93% - 100%)    Parameters below as of 09 Oct 2023 13:22  Patient On (Oxygen Delivery Method): nasal cannula  O2 Flow (L/min): 2      PHYSICAL EXAM:    General: Sitting at side of bed  HEENT: NC/AT; PERRL, anicteric sclera; MMM  Neck: supple w/o palpable nodularity  Cardiovascular: +S1/S2; RRR  Respiratory: CTA B/L; b/l wheezing, no retractions  Gastrointestinal: soft, NT/ND; +BSx4  Extremities: WWP; no edema, clubbing or cyanosis  Vascular: 2+ radial, DP/PT pulses B/L  Neurological: AAOx3; no focal deficits    MEDICATIONS:  MEDICATIONS  (STANDING):  albuterol/ipratropium for Nebulization 3 milliLiter(s) Nebulizer every 4 hours  amLODIPine   Tablet 10 milliGRAM(s) Oral daily  atorvastatin 40 milliGRAM(s) Oral at bedtime  budesonide 160 MICROgram(s)/formoterol 4.5 MICROgram(s) Inhaler 2 Puff(s) Inhalation two times a day  enoxaparin Injectable 40 milliGRAM(s) SubCutaneous every 24 hours  insulin lispro (ADMELOG) corrective regimen sliding scale   SubCutaneous three times a day before meals  insulin lispro (ADMELOG) corrective regimen sliding scale   SubCutaneous at bedtime  levothyroxine 75 MICROGram(s) Oral daily  pantoprazole    Tablet 40 milliGRAM(s) Oral before breakfast  predniSONE   Tablet 40 milliGRAM(s) Oral every 24 hours    MEDICATIONS  (PRN):  acetaminophen     Tablet .. 650 milliGRAM(s) Oral every 6 hours PRN Temp greater or equal to 38C (100.4F), Mild Pain (1 - 3)  aluminum hydroxide/magnesium hydroxide/simethicone Suspension 30 milliLiter(s) Oral every 4 hours PRN Dyspepsia  melatonin 3 milliGRAM(s) Oral at bedtime PRN Insomnia  ondansetron Injectable 4 milliGRAM(s) IV Push every 8 hours PRN Nausea and/or Vomiting      ALLERGIES:  Allergies    peanuts (Unknown)  No Known Drug Allergies    Intolerances        LABS:                        14.0   7.11  )-----------( 250      ( 09 Oct 2023 05:30 )             42.1     10-09    138  |  105  |  14  ----------------------------<  184<H>  4.3   |  22  |  0.89    Ca    9.8      09 Oct 2023 05:30  Phos  3.0     10-09  Mg     2.5     10-09    TPro  7.3  /  Alb  4.0  /  TBili  0.3  /  DBili  x   /  AST  20  /  ALT  15  /  AlkPhos  111  10-09      Urinalysis Basic - ( 09 Oct 2023 05:30 )    Color: x / Appearance: x / SG: x / pH: x  Gluc: 184 mg/dL / Ketone: x  / Bili: x / Urobili: x   Blood: x / Protein: x / Nitrite: x   Leuk Esterase: x / RBC: x / WBC x   Sq Epi: x / Non Sq Epi: x / Bacteria: x      CAPILLARY BLOOD GLUCOSE      POCT Blood Glucose.: 150 mg/dL (09 Oct 2023 16:11)      RADIOLOGY & ADDITIONAL TESTS: Reviewed.

## 2023-10-09 NOTE — PROGRESS NOTE ADULT - SUBJECTIVE AND OBJECTIVE BOX
******TRANSFER FROM [facility] to [facility]********    Hospital Course:   67F PMHx asthma (no intubations, last admit many yrs ago, last steroids 2 yr ago), lung nodules, active smoker, HTN, HLD, Hypothyroid p/w 3d hx sob/wheezing/dry cough/rhinorrhea/nasal congestion iso asthma exacerbation 2/2 viral respiratory infection. Pt s/paAmlodipine 10 x1, Mag sulfate 2g x1, Solumedrol 125 IVP x1, Duoneb x1, Albulterol neb x2. Initially placed on bipap for iWOB, weaned to NC. Saturating well, no iWOB however still wheezing. Pt saturating well and stable on 2L NC. Pt transferred from [facility] to [facility] for continued management.       INTERVAL HPI/OVERNIGHT EVENTS:  Patient was seen and examined at bedside. Pt states she has some SOB, and wheezing, but has improved since yesterday.  As per nurse and patient, no o/n events, patient resting comfortably. No complaints at this time. Patient denies: fever, chills, lightheadedness, weakness, CP, palpitations, N/V. ROS otherwise negative.    VITAL SIGNS:  T(F): 97.4 (10-09-23 @ 10:09)  HR: 107 (10-09-23 @ 09:05)  BP: 119/87 (10-09-23 @ 08:32)  RR: 35 (10-09-23 @ 09:05)  SpO2: 100% (10-09-23 @ 09:05)  Wt(kg): --      10-09-23 @ 07:01  -  10-09-23 @ 12:08  --------------------------------------------------------  IN: 236 mL / OUT: 0 mL / NET: 236 mL        PHYSICAL EXAM:    Constitutional: resting comfortably in bed; NAD  HEENT: NC/AT, PER, anicteric sclera, no nasal discharge; MMM  Neck: supple; no JVD or thyromegaly  Respiratory: CTA b/l. wheezing b/l no retractions  Cardiac: +S1/S2; RRR; no M/R/G  Gastrointestinal: soft, NT/ND; no rebound or guarding  Back: spine midline, no bony tenderness or step-offs; no CVAT B/L  Extremities: WWP, no clubbing or cyanosis; no peripheral edema  Musculoskeletal: NROM x4; no joint swelling, tenderness or erythema  Vascular: 2+ radial, DP/PT pulses B/L  Dermatologic: skin warm, dry and intact; no rashes, wounds, or scars  Neurologic: AAOx3; CNII-XII grossly intact; no focal deficits  Psychiatric: affect and characteristics of appearance, verbalizations, behaviors are appropriate    MEDICATIONS  (STANDING):  albuterol/ipratropium for Nebulization 3 milliLiter(s) Nebulizer every 4 hours  amLODIPine   Tablet 10 milliGRAM(s) Oral daily  atorvastatin 40 milliGRAM(s) Oral at bedtime  budesonide 160 MICROgram(s)/formoterol 4.5 MICROgram(s) Inhaler 2 Puff(s) Inhalation two times a day  enoxaparin Injectable 40 milliGRAM(s) SubCutaneous every 24 hours  insulin lispro (ADMELOG) corrective regimen sliding scale   SubCutaneous three times a day before meals  insulin lispro (ADMELOG) corrective regimen sliding scale   SubCutaneous at bedtime  levothyroxine 75 MICROGram(s) Oral daily  pantoprazole    Tablet 40 milliGRAM(s) Oral before breakfast  predniSONE   Tablet 40 milliGRAM(s) Oral every 24 hours    MEDICATIONS  (PRN):  acetaminophen     Tablet .. 650 milliGRAM(s) Oral every 6 hours PRN Temp greater or equal to 38C (100.4F), Mild Pain (1 - 3)  aluminum hydroxide/magnesium hydroxide/simethicone Suspension 30 milliLiter(s) Oral every 4 hours PRN Dyspepsia  melatonin 3 milliGRAM(s) Oral at bedtime PRN Insomnia  ondansetron Injectable 4 milliGRAM(s) IV Push every 8 hours PRN Nausea and/or Vomiting      Allergies    peanuts (Unknown)  No Known Drug Allergies    Intolerances        LABS:                        14.0   7.11  )-----------( 250      ( 09 Oct 2023 05:30 )             42.1     10-09    138  |  105  |  14  ----------------------------<  184<H>  4.3   |  22  |  0.89    Ca    9.8      09 Oct 2023 05:30  Phos  3.0     10-09  Mg     2.5     10-09    TPro  7.3  /  Alb  4.0  /  TBili  0.3  /  DBili  x   /  AST  20  /  ALT  15  /  AlkPhos  111  10-09      Urinalysis Basic - ( 09 Oct 2023 05:30 )    Color: x / Appearance: x / SG: x / pH: x  Gluc: 184 mg/dL / Ketone: x  / Bili: x / Urobili: x   Blood: x / Protein: x / Nitrite: x   Leuk Esterase: x / RBC: x / WBC x   Sq Epi: x / Non Sq Epi: x / Bacteria: x        RADIOLOGY & ADDITIONAL TESTS:  Reviewed ******TRANSFER FROM [facility] to [facility]********    Hospital Course:   67F PMHx asthma (no intubations, last admit many yrs ago, last steroids 2 yr ago), lung nodules, active smoker, HTN, HLD, Hypothyroid p/w 3d hx sob/wheezing/dry cough/rhinorrhea/nasal congestion iso asthma exacerbation 2/2 viral respiratory infection (RVP +). Pt s/paAmlodipine 10 x1, Mag sulfate 2g x1, Solumedrol 125 IVP x1, Duoneb x1, Albulterol neb x2. Initially placed on bipap for iWOB, weaned to NC. Saturating well, no iWOB however still wheezing. Pt saturating well and stable on 2L NC. Pt transferred from [facility] to [facility] for continued management.       INTERVAL HPI/OVERNIGHT EVENTS:  Patient was seen and examined at bedside. Pt states she has some SOB, and wheezing, but has improved since yesterday.  As per nurse and patient, no o/n events, patient resting comfortably. No complaints at this time. Patient denies: fever, chills, lightheadedness, weakness, CP, palpitations, N/V. ROS otherwise negative.    VITAL SIGNS:  T(F): 97.4 (10-09-23 @ 10:09)  HR: 107 (10-09-23 @ 09:05)  BP: 119/87 (10-09-23 @ 08:32)  RR: 35 (10-09-23 @ 09:05)  SpO2: 100% (10-09-23 @ 09:05)  Wt(kg): --      10-09-23 @ 07:01  -  10-09-23 @ 12:08  --------------------------------------------------------  IN: 236 mL / OUT: 0 mL / NET: 236 mL        PHYSICAL EXAM:    Constitutional: resting comfortably in bed; NAD  HEENT: NC/AT, PER, anicteric sclera, no nasal discharge; MMM  Neck: supple; no JVD or thyromegaly  Respiratory: CTA b/l. wheezing b/l no retractions  Cardiac: +S1/S2; RRR; no M/R/G  Gastrointestinal: soft, NT/ND; no rebound or guarding  Back: spine midline, no bony tenderness or step-offs; no CVAT B/L  Extremities: WWP, no clubbing or cyanosis; no peripheral edema  Musculoskeletal: NROM x4; no joint swelling, tenderness or erythema  Vascular: 2+ radial, DP/PT pulses B/L  Dermatologic: skin warm, dry and intact; no rashes, wounds, or scars  Neurologic: AAOx3; CNII-XII grossly intact; no focal deficits  Psychiatric: affect and characteristics of appearance, verbalizations, behaviors are appropriate    MEDICATIONS  (STANDING):  albuterol/ipratropium for Nebulization 3 milliLiter(s) Nebulizer every 4 hours  amLODIPine   Tablet 10 milliGRAM(s) Oral daily  atorvastatin 40 milliGRAM(s) Oral at bedtime  budesonide 160 MICROgram(s)/formoterol 4.5 MICROgram(s) Inhaler 2 Puff(s) Inhalation two times a day  enoxaparin Injectable 40 milliGRAM(s) SubCutaneous every 24 hours  insulin lispro (ADMELOG) corrective regimen sliding scale   SubCutaneous three times a day before meals  insulin lispro (ADMELOG) corrective regimen sliding scale   SubCutaneous at bedtime  levothyroxine 75 MICROGram(s) Oral daily  pantoprazole    Tablet 40 milliGRAM(s) Oral before breakfast  predniSONE   Tablet 40 milliGRAM(s) Oral every 24 hours    MEDICATIONS  (PRN):  acetaminophen     Tablet .. 650 milliGRAM(s) Oral every 6 hours PRN Temp greater or equal to 38C (100.4F), Mild Pain (1 - 3)  aluminum hydroxide/magnesium hydroxide/simethicone Suspension 30 milliLiter(s) Oral every 4 hours PRN Dyspepsia  melatonin 3 milliGRAM(s) Oral at bedtime PRN Insomnia  ondansetron Injectable 4 milliGRAM(s) IV Push every 8 hours PRN Nausea and/or Vomiting      Allergies    peanuts (Unknown)  No Known Drug Allergies    Intolerances        LABS:                        14.0   7.11  )-----------( 250      ( 09 Oct 2023 05:30 )             42.1     10-09    138  |  105  |  14  ----------------------------<  184<H>  4.3   |  22  |  0.89    Ca    9.8      09 Oct 2023 05:30  Phos  3.0     10-09  Mg     2.5     10-09    TPro  7.3  /  Alb  4.0  /  TBili  0.3  /  DBili  x   /  AST  20  /  ALT  15  /  AlkPhos  111  10-09      Urinalysis Basic - ( 09 Oct 2023 05:30 )    Color: x / Appearance: x / SG: x / pH: x  Gluc: 184 mg/dL / Ketone: x  / Bili: x / Urobili: x   Blood: x / Protein: x / Nitrite: x   Leuk Esterase: x / RBC: x / WBC x   Sq Epi: x / Non Sq Epi: x / Bacteria: x        RADIOLOGY & ADDITIONAL TESTS:  Reviewed

## 2023-10-09 NOTE — PROGRESS NOTE ADULT - PROBLEM SELECTOR PLAN 1
#COPD exacerbation  No intubations, last admit many yrs ago, last steroids 2 yr ago, home med Advair-diskus 500-50 Q12 and Albuterol inhaler PRN. P/w 3 d hx sob/wheezing/dry cough/rhinorrhea/nasal congestion 2/2 viral respiratory infection. Solumedrol, duonebs in ED      - Duonebs Q4 standing  - C/w home advair disku 500-50 Q12 (Therapeutic exchange)  - Prednisone 40 Qd for 5 day course  - mISS and PPI while on Pred  - If WOB increases and pt desaturates, place pt back on Bipap

## 2023-10-09 NOTE — PROGRESS NOTE ADULT - PROBLEM SELECTOR PLAN 6
1/2 PPD since 20 y/o, total of 48 pack year smoking hx    - Pt refusing nicotine replacement therapy at this time

## 2023-10-09 NOTE — PROGRESS NOTE ADULT - PROBLEM SELECTOR PLAN 3
RVP positive for rhinovirus. Covid negative. CXR performed pending read, no overt consolidations noted by author. Pt w/o purulent sputum, WBC WNL, afebrile.   Procal WNL, less likely bacterial   - Address COPD/Asthma exacerbation as above  - Monitor off abx for now iso likely viral etiology

## 2023-10-10 LAB
ALBUMIN SERPL ELPH-MCNC: 4.1 G/DL — SIGNIFICANT CHANGE UP (ref 3.3–5)
ALP SERPL-CCNC: 102 U/L — SIGNIFICANT CHANGE UP (ref 40–120)
ALT FLD-CCNC: 17 U/L — SIGNIFICANT CHANGE UP (ref 10–45)
ANION GAP SERPL CALC-SCNC: 10 MMOL/L — SIGNIFICANT CHANGE UP (ref 5–17)
AST SERPL-CCNC: 28 U/L — SIGNIFICANT CHANGE UP (ref 10–40)
BASOPHILS # BLD AUTO: 0.02 K/UL — SIGNIFICANT CHANGE UP (ref 0–0.2)
BASOPHILS NFR BLD AUTO: 0.2 % — SIGNIFICANT CHANGE UP (ref 0–2)
BILIRUB SERPL-MCNC: 0.2 MG/DL — SIGNIFICANT CHANGE UP (ref 0.2–1.2)
BUN SERPL-MCNC: 22 MG/DL — SIGNIFICANT CHANGE UP (ref 7–23)
CALCIUM SERPL-MCNC: 9.5 MG/DL — SIGNIFICANT CHANGE UP (ref 8.4–10.5)
CHLORIDE SERPL-SCNC: 104 MMOL/L — SIGNIFICANT CHANGE UP (ref 96–108)
CO2 SERPL-SCNC: 25 MMOL/L — SIGNIFICANT CHANGE UP (ref 22–31)
CREAT SERPL-MCNC: 0.99 MG/DL — SIGNIFICANT CHANGE UP (ref 0.5–1.3)
EGFR: 63 ML/MIN/1.73M2 — SIGNIFICANT CHANGE UP
EOSINOPHIL # BLD AUTO: 0 K/UL — SIGNIFICANT CHANGE UP (ref 0–0.5)
EOSINOPHIL NFR BLD AUTO: 0 % — SIGNIFICANT CHANGE UP (ref 0–6)
GLUCOSE BLDC GLUCOMTR-MCNC: 131 MG/DL — HIGH (ref 70–99)
GLUCOSE BLDC GLUCOMTR-MCNC: 146 MG/DL — HIGH (ref 70–99)
GLUCOSE BLDC GLUCOMTR-MCNC: 194 MG/DL — HIGH (ref 70–99)
GLUCOSE SERPL-MCNC: 198 MG/DL — HIGH (ref 70–99)
HCT VFR BLD CALC: 41.3 % — SIGNIFICANT CHANGE UP (ref 34.5–45)
HGB BLD-MCNC: 13.4 G/DL — SIGNIFICANT CHANGE UP (ref 11.5–15.5)
IMM GRANULOCYTES NFR BLD AUTO: 1.2 % — HIGH (ref 0–0.9)
LYMPHOCYTES # BLD AUTO: 1.13 K/UL — SIGNIFICANT CHANGE UP (ref 1–3.3)
LYMPHOCYTES # BLD AUTO: 10.6 % — LOW (ref 13–44)
MAGNESIUM SERPL-MCNC: 2.2 MG/DL — SIGNIFICANT CHANGE UP (ref 1.6–2.6)
MCHC RBC-ENTMCNC: 30.7 PG — SIGNIFICANT CHANGE UP (ref 27–34)
MCHC RBC-ENTMCNC: 32.4 GM/DL — SIGNIFICANT CHANGE UP (ref 32–36)
MCV RBC AUTO: 94.7 FL — SIGNIFICANT CHANGE UP (ref 80–100)
MONOCYTES # BLD AUTO: 1 K/UL — HIGH (ref 0–0.9)
MONOCYTES NFR BLD AUTO: 9.4 % — SIGNIFICANT CHANGE UP (ref 2–14)
NEUTROPHILS # BLD AUTO: 8.39 K/UL — HIGH (ref 1.8–7.4)
NEUTROPHILS NFR BLD AUTO: 78.6 % — HIGH (ref 43–77)
NRBC # BLD: 0 /100 WBCS — SIGNIFICANT CHANGE UP (ref 0–0)
PHOSPHATE SERPL-MCNC: 3.8 MG/DL — SIGNIFICANT CHANGE UP (ref 2.5–4.5)
PLATELET # BLD AUTO: 266 K/UL — SIGNIFICANT CHANGE UP (ref 150–400)
POTASSIUM SERPL-MCNC: 5.2 MMOL/L — SIGNIFICANT CHANGE UP (ref 3.5–5.3)
POTASSIUM SERPL-SCNC: 5.2 MMOL/L — SIGNIFICANT CHANGE UP (ref 3.5–5.3)
PROT SERPL-MCNC: 6.7 G/DL — SIGNIFICANT CHANGE UP (ref 6–8.3)
RBC # BLD: 4.36 M/UL — SIGNIFICANT CHANGE UP (ref 3.8–5.2)
RBC # FLD: 14 % — SIGNIFICANT CHANGE UP (ref 10.3–14.5)
SODIUM SERPL-SCNC: 139 MMOL/L — SIGNIFICANT CHANGE UP (ref 135–145)
WBC # BLD: 10.67 K/UL — HIGH (ref 3.8–10.5)
WBC # FLD AUTO: 10.67 K/UL — HIGH (ref 3.8–10.5)

## 2023-10-10 PROCEDURE — 99233 SBSQ HOSP IP/OBS HIGH 50: CPT | Mod: GC

## 2023-10-10 RX ORDER — AZITHROMYCIN 500 MG/1
500 TABLET, FILM COATED ORAL EVERY 24 HOURS
Refills: 0 | Status: DISCONTINUED | OUTPATIENT
Start: 2023-10-10 | End: 2023-10-12

## 2023-10-10 RX ADMIN — Medication 3 MILLILITER(S): at 02:37

## 2023-10-10 RX ADMIN — Medication 3 MILLILITER(S): at 09:45

## 2023-10-10 RX ADMIN — Medication 3 MILLILITER(S): at 05:50

## 2023-10-10 RX ADMIN — Medication 40 MILLIGRAM(S): at 16:07

## 2023-10-10 RX ADMIN — Medication 75 MICROGRAM(S): at 06:45

## 2023-10-10 RX ADMIN — BUDESONIDE AND FORMOTEROL FUMARATE DIHYDRATE 2 PUFF(S): 160; 4.5 AEROSOL RESPIRATORY (INHALATION) at 16:24

## 2023-10-10 RX ADMIN — AMLODIPINE BESYLATE 10 MILLIGRAM(S): 2.5 TABLET ORAL at 06:59

## 2023-10-10 RX ADMIN — PANTOPRAZOLE SODIUM 40 MILLIGRAM(S): 20 TABLET, DELAYED RELEASE ORAL at 06:56

## 2023-10-10 RX ADMIN — Medication 3 MILLILITER(S): at 16:07

## 2023-10-10 RX ADMIN — AZITHROMYCIN 500 MILLIGRAM(S): 500 TABLET, FILM COATED ORAL at 16:07

## 2023-10-10 RX ADMIN — Medication 2: at 09:44

## 2023-10-10 NOTE — PROGRESS NOTE ADULT - PROBLEM SELECTOR PLAN 6
1/2 PPD since 20 y/o, total of 48 pack year smoking hx  - Pt refusing nicotine replacement therapy at this time  - Education patient on harms of smoking and pros of cessation 1/2 PPD since 20 y/o, total of 48 pack year smoking hx  - Pt refusing nicotine replacement therapy at this time  - Educated patient on harms of smoking and pros of cessation  - Low Dose CT chest ordered for screening

## 2023-10-10 NOTE — PROGRESS NOTE ADULT - TIME BILLING
Reviewed labs, imaging, hospital course, EKG. Case discussed with house staff, critical care staff. Thorough H/P. Decision regarding continued inpatient management made.

## 2023-10-10 NOTE — PROGRESS NOTE ADULT - PROBLEM SELECTOR PLAN 3
RVP positive for rhinovirus. Covid negative. CXR performed pending read, no overt consolidations noted by author. Pt w/o purulent sputum, WBC WNL, afebrile.   Procal WNL, less likely bacterial   - Address COPD/Asthma exacerbation as above  - Monitor off abx for now iso likely viral etiology RVP positive for rhinovirus. Covid negative. CXR performed pending read, no overt consolidations noted by author. Pt w/o purulent sputum, WBC WNL, afebrile.   Procal WNL, less likely bacterial   - Address COPD/Asthma exacerbation as above  - azithromycin 500mg x 3 days started

## 2023-10-10 NOTE — PROGRESS NOTE ADULT - SUBJECTIVE AND OBJECTIVE BOX
**INCOMPLETE NOTE    OVERNIGHT EVENTS:    SUBJECTIVE:  Patient seen and examined at bedside.    Vital Signs Last 12 Hrs  T(F): 98.1 (10-10-23 @ 06:08), Max: 98.1 (10-10-23 @ 06:08)  HR: 96 (10-10-23 @ 06:08) (96 - 100)  BP: 147/79 (10-10-23 @ 06:08) (138/94 - 147/79)  BP(mean): --  RR: 20 (10-10-23 @ 06:08) (20 - 20)  SpO2: 98% (10-10-23 @ 06:08) (98% - 99%)  I&O's Summary    09 Oct 2023 07:01  -  10 Oct 2023 07:00  --------------------------------------------------------  IN: 354 mL / OUT: 1 mL / NET: 353 mL        PHYSICAL EXAM:  Constitutional: NAD, comfortable in bed.  HEENT: NC/AT, PERRLA, EOMI, no conjunctival pallor or scleral icterus, MMM  Neck: Supple, no JVD  Respiratory: CTA B/L. No w/r/r.   Cardiovascular: RRR, normal S1 and S2, no m/r/g.   Gastrointestinal: +BS, soft NTND, no guarding or rebound tenderness, no palpable masses   Extremities: wwp; no cyanosis, clubbing or edema.   Vascular: Pulses equal and strong throughout.   Neurological: AAOx3, no CN deficits, strength and sensation intact throughout.   Skin: No gross skin abnormalities or rashes        LABS:                        13.4   10.67 )-----------( 266      ( 10 Oct 2023 05:30 )             41.3     10-10    139  |  104  |  x   ----------------------------<  x   5.2   |  x   |  x     Ca    9.8      09 Oct 2023 05:30  Phos  3.8     10-10  Mg     2.2     10-10    TPro  7.3  /  Alb  4.0  /  TBili  0.3  /  DBili  x   /  AST  20  /  ALT  15  /  AlkPhos  111  10-09      Urinalysis Basic - ( 09 Oct 2023 05:30 )    Color: x / Appearance: x / SG: x / pH: x  Gluc: 184 mg/dL / Ketone: x  / Bili: x / Urobili: x   Blood: x / Protein: x / Nitrite: x   Leuk Esterase: x / RBC: x / WBC x   Sq Epi: x / Non Sq Epi: x / Bacteria: x          RADIOLOGY & ADDITIONAL TESTS:    MEDICATIONS  (STANDING):  albuterol/ipratropium for Nebulization 3 milliLiter(s) Nebulizer every 4 hours  amLODIPine   Tablet 10 milliGRAM(s) Oral daily  atorvastatin 40 milliGRAM(s) Oral at bedtime  budesonide 160 MICROgram(s)/formoterol 4.5 MICROgram(s) Inhaler 2 Puff(s) Inhalation two times a day  enoxaparin Injectable 40 milliGRAM(s) SubCutaneous every 24 hours  insulin lispro (ADMELOG) corrective regimen sliding scale   SubCutaneous three times a day before meals  insulin lispro (ADMELOG) corrective regimen sliding scale   SubCutaneous at bedtime  levothyroxine 75 MICROGram(s) Oral daily  pantoprazole    Tablet 40 milliGRAM(s) Oral before breakfast  predniSONE   Tablet 40 milliGRAM(s) Oral every 24 hours    MEDICATIONS  (PRN):  acetaminophen     Tablet .. 650 milliGRAM(s) Oral every 6 hours PRN Temp greater or equal to 38C (100.4F), Mild Pain (1 - 3)  aluminum hydroxide/magnesium hydroxide/simethicone Suspension 30 milliLiter(s) Oral every 4 hours PRN Dyspepsia  melatonin 3 milliGRAM(s) Oral at bedtime PRN Insomnia  ondansetron Injectable 4 milliGRAM(s) IV Push every 8 hours PRN Nausea and/or Vomiting   OVERNIGHT EVENTS: Patient still exhibiting constitutional signs of a URI    SUBJECTIVE:  Patient seen and examined at bedside.    Vital Signs Last 12 Hrs  T(F): 98.1 (10-10-23 @ 06:08), Max: 98.1 (10-10-23 @ 06:08)  HR: 96 (10-10-23 @ 06:08) (96 - 100)  BP: 147/79 (10-10-23 @ 06:08) (138/94 - 147/79)  BP(mean): --  RR: 20 (10-10-23 @ 06:08) (20 - 20)  SpO2: 98% (10-10-23 @ 06:08) (98% - 99%)  I&O's Summary    09 Oct 2023 07:01  -  10 Oct 2023 07:00  --------------------------------------------------------  IN: 354 mL / OUT: 1 mL / NET: 353 mL        PHYSICAL EXAM:    General: Sitting at side of bed  HEENT: NC/AT; PERRL, anicteric sclera; MMM  Neck: supple w/o palpable nodularity  Cardiovascular: +S1/S2; RRR  Respiratory: CTA B/L; b/l wheezing, no retractions  Gastrointestinal: soft, NT/ND; +BSx4  Extremities: WWP; no edema, clubbing or cyanosis  Vascular: 2+ radial, DP/PT pulses B/L  Neurological: AAOx3; no focal deficits  rashes        LABS:                        13.4   10.67 )-----------( 266      ( 10 Oct 2023 05:30 )             41.3     10-10    139  |  104  |  x   ----------------------------<  x   5.2   |  x   |  x     Ca    9.8      09 Oct 2023 05:30  Phos  3.8     10-10  Mg     2.2     10-10    TPro  7.3  /  Alb  4.0  /  TBili  0.3  /  DBili  x   /  AST  20  /  ALT  15  /  AlkPhos  111  10-09      Urinalysis Basic - ( 09 Oct 2023 05:30 )    Color: x / Appearance: x / SG: x / pH: x  Gluc: 184 mg/dL / Ketone: x  / Bili: x / Urobili: x   Blood: x / Protein: x / Nitrite: x   Leuk Esterase: x / RBC: x / WBC x   Sq Epi: x / Non Sq Epi: x / Bacteria: x          RADIOLOGY & ADDITIONAL TESTS:    MEDICATIONS  (STANDING):  albuterol/ipratropium for Nebulization 3 milliLiter(s) Nebulizer every 4 hours  amLODIPine   Tablet 10 milliGRAM(s) Oral daily  atorvastatin 40 milliGRAM(s) Oral at bedtime  budesonide 160 MICROgram(s)/formoterol 4.5 MICROgram(s) Inhaler 2 Puff(s) Inhalation two times a day  enoxaparin Injectable 40 milliGRAM(s) SubCutaneous every 24 hours  insulin lispro (ADMELOG) corrective regimen sliding scale   SubCutaneous three times a day before meals  insulin lispro (ADMELOG) corrective regimen sliding scale   SubCutaneous at bedtime  levothyroxine 75 MICROGram(s) Oral daily  pantoprazole    Tablet 40 milliGRAM(s) Oral before breakfast  predniSONE   Tablet 40 milliGRAM(s) Oral every 24 hours    MEDICATIONS  (PRN):  acetaminophen     Tablet .. 650 milliGRAM(s) Oral every 6 hours PRN Temp greater or equal to 38C (100.4F), Mild Pain (1 - 3)  aluminum hydroxide/magnesium hydroxide/simethicone Suspension 30 milliLiter(s) Oral every 4 hours PRN Dyspepsia  melatonin 3 milliGRAM(s) Oral at bedtime PRN Insomnia  ondansetron Injectable 4 milliGRAM(s) IV Push every 8 hours PRN Nausea and/or Vomiting

## 2023-10-10 NOTE — PROGRESS NOTE ADULT - PROBLEM SELECTOR PLAN 1
#COPD exacerbation  No intubations, last admit many yrs ago, last steroids 2 yr ago, home med Advair-diskus 500-50 Q12 and Albuterol inhaler PRN. P/w 3 d hx sob/wheezing/dry cough/rhinorrhea/nasal congestion 2/2 viral respiratory infection. Solumedrol, rubioonezeke in ED. Sees Dr. Estuardo Tay (pulmonologist)      - Duonebs Q4 standing  - C/w home advair disku 500-50 Q12 (Therapeutic exchange)  - Prednisone 40 Qd for 5 day course  - mISS and PPI while on Pred  - check peak flow daily  - If WOB increases and pt desaturates, place pt back on Bipap

## 2023-10-11 DIAGNOSIS — E11.9 TYPE 2 DIABETES MELLITUS WITHOUT COMPLICATIONS: ICD-10-CM

## 2023-10-11 LAB
ALBUMIN SERPL ELPH-MCNC: 3.9 G/DL — SIGNIFICANT CHANGE UP (ref 3.3–5)
ALP SERPL-CCNC: 100 U/L — SIGNIFICANT CHANGE UP (ref 40–120)
ALT FLD-CCNC: 19 U/L — SIGNIFICANT CHANGE UP (ref 10–45)
ANION GAP SERPL CALC-SCNC: 10 MMOL/L — SIGNIFICANT CHANGE UP (ref 5–17)
ANISOCYTOSIS BLD QL: SLIGHT — SIGNIFICANT CHANGE UP
AST SERPL-CCNC: 23 U/L — SIGNIFICANT CHANGE UP (ref 10–40)
BASOPHILS # BLD AUTO: 0 K/UL — SIGNIFICANT CHANGE UP (ref 0–0.2)
BASOPHILS NFR BLD AUTO: 0 % — SIGNIFICANT CHANGE UP (ref 0–2)
BILIRUB SERPL-MCNC: 0.2 MG/DL — SIGNIFICANT CHANGE UP (ref 0.2–1.2)
BUN SERPL-MCNC: 18 MG/DL — SIGNIFICANT CHANGE UP (ref 7–23)
BURR CELLS BLD QL SMEAR: PRESENT — SIGNIFICANT CHANGE UP
CALCIUM SERPL-MCNC: 9.3 MG/DL — SIGNIFICANT CHANGE UP (ref 8.4–10.5)
CHLORIDE SERPL-SCNC: 105 MMOL/L — SIGNIFICANT CHANGE UP (ref 96–108)
CO2 SERPL-SCNC: 25 MMOL/L — SIGNIFICANT CHANGE UP (ref 22–31)
CREAT SERPL-MCNC: 0.95 MG/DL — SIGNIFICANT CHANGE UP (ref 0.5–1.3)
EGFR: 66 ML/MIN/1.73M2 — SIGNIFICANT CHANGE UP
EOSINOPHIL # BLD AUTO: 0 K/UL — SIGNIFICANT CHANGE UP (ref 0–0.5)
EOSINOPHIL NFR BLD AUTO: 0 % — SIGNIFICANT CHANGE UP (ref 0–6)
GIANT PLATELETS BLD QL SMEAR: PRESENT — SIGNIFICANT CHANGE UP
GLUCOSE BLDC GLUCOMTR-MCNC: 114 MG/DL — HIGH (ref 70–99)
GLUCOSE BLDC GLUCOMTR-MCNC: 142 MG/DL — HIGH (ref 70–99)
GLUCOSE BLDC GLUCOMTR-MCNC: 143 MG/DL — HIGH (ref 70–99)
GLUCOSE BLDC GLUCOMTR-MCNC: 201 MG/DL — HIGH (ref 70–99)
GLUCOSE SERPL-MCNC: 143 MG/DL — HIGH (ref 70–99)
HCT VFR BLD CALC: 42.3 % — SIGNIFICANT CHANGE UP (ref 34.5–45)
HGB BLD-MCNC: 13.6 G/DL — SIGNIFICANT CHANGE UP (ref 11.5–15.5)
LYMPHOCYTES # BLD AUTO: 2.2 K/UL — SIGNIFICANT CHANGE UP (ref 1–3.3)
LYMPHOCYTES # BLD AUTO: 25.4 % — SIGNIFICANT CHANGE UP (ref 13–44)
MACROCYTES BLD QL: SLIGHT — SIGNIFICANT CHANGE UP
MAGNESIUM SERPL-MCNC: 2.1 MG/DL — SIGNIFICANT CHANGE UP (ref 1.6–2.6)
MANUAL SMEAR VERIFICATION: SIGNIFICANT CHANGE UP
MCHC RBC-ENTMCNC: 30.8 PG — SIGNIFICANT CHANGE UP (ref 27–34)
MCHC RBC-ENTMCNC: 32.2 GM/DL — SIGNIFICANT CHANGE UP (ref 32–36)
MCV RBC AUTO: 95.9 FL — SIGNIFICANT CHANGE UP (ref 80–100)
MONOCYTES # BLD AUTO: 1.22 K/UL — HIGH (ref 0–0.9)
MONOCYTES NFR BLD AUTO: 14 % — SIGNIFICANT CHANGE UP (ref 2–14)
MYELOCYTES NFR BLD: 0.9 % — HIGH (ref 0–0)
NEUTROPHILS # BLD AUTO: 5.18 K/UL — SIGNIFICANT CHANGE UP (ref 1.8–7.4)
NEUTROPHILS NFR BLD AUTO: 58.8 % — SIGNIFICANT CHANGE UP (ref 43–77)
NEUTS BAND # BLD: 0.9 % — SIGNIFICANT CHANGE UP (ref 0–8)
OVALOCYTES BLD QL SMEAR: SLIGHT — SIGNIFICANT CHANGE UP
PHOSPHATE SERPL-MCNC: 4.1 MG/DL — SIGNIFICANT CHANGE UP (ref 2.5–4.5)
PLAT MORPH BLD: ABNORMAL
PLATELET # BLD AUTO: 267 K/UL — SIGNIFICANT CHANGE UP (ref 150–400)
POIKILOCYTOSIS BLD QL AUTO: SLIGHT — SIGNIFICANT CHANGE UP
POLYCHROMASIA BLD QL SMEAR: SLIGHT — SIGNIFICANT CHANGE UP
POTASSIUM SERPL-MCNC: 4.6 MMOL/L — SIGNIFICANT CHANGE UP (ref 3.5–5.3)
POTASSIUM SERPL-SCNC: 4.6 MMOL/L — SIGNIFICANT CHANGE UP (ref 3.5–5.3)
PROT SERPL-MCNC: 6.7 G/DL — SIGNIFICANT CHANGE UP (ref 6–8.3)
RBC # BLD: 4.41 M/UL — SIGNIFICANT CHANGE UP (ref 3.8–5.2)
RBC # FLD: 13.9 % — SIGNIFICANT CHANGE UP (ref 10.3–14.5)
RBC BLD AUTO: ABNORMAL
SODIUM SERPL-SCNC: 140 MMOL/L — SIGNIFICANT CHANGE UP (ref 135–145)
WBC # BLD: 8.68 K/UL — SIGNIFICANT CHANGE UP (ref 3.8–10.5)
WBC # FLD AUTO: 8.68 K/UL — SIGNIFICANT CHANGE UP (ref 3.8–10.5)

## 2023-10-11 PROCEDURE — 99232 SBSQ HOSP IP/OBS MODERATE 35: CPT | Mod: GC

## 2023-10-11 RX ORDER — IPRATROPIUM/ALBUTEROL SULFATE 18-103MCG
3 AEROSOL WITH ADAPTER (GRAM) INHALATION EVERY 6 HOURS
Refills: 0 | Status: DISCONTINUED | OUTPATIENT
Start: 2023-10-11 | End: 2023-10-12

## 2023-10-11 RX ORDER — METFORMIN HYDROCHLORIDE 850 MG/1
500 TABLET ORAL EVERY 12 HOURS
Refills: 0 | Status: DISCONTINUED | OUTPATIENT
Start: 2023-10-11 | End: 2023-10-12

## 2023-10-11 RX ORDER — ISOPROPYL ALCOHOL, BENZOCAINE .7; .06 ML/ML; ML/ML
0 SWAB TOPICAL
Qty: 100 | Refills: 1
Start: 2023-10-11

## 2023-10-11 RX ADMIN — Medication 3 MILLILITER(S): at 06:54

## 2023-10-11 RX ADMIN — AMLODIPINE BESYLATE 10 MILLIGRAM(S): 2.5 TABLET ORAL at 06:55

## 2023-10-11 RX ADMIN — Medication 3 MILLILITER(S): at 22:44

## 2023-10-11 RX ADMIN — ATORVASTATIN CALCIUM 40 MILLIGRAM(S): 80 TABLET, FILM COATED ORAL at 22:44

## 2023-10-11 RX ADMIN — Medication 3 MILLILITER(S): at 12:38

## 2023-10-11 RX ADMIN — Medication 75 MICROGRAM(S): at 06:54

## 2023-10-11 RX ADMIN — BUDESONIDE AND FORMOTEROL FUMARATE DIHYDRATE 2 PUFF(S): 160; 4.5 AEROSOL RESPIRATORY (INHALATION) at 22:44

## 2023-10-11 RX ADMIN — METFORMIN HYDROCHLORIDE 500 MILLIGRAM(S): 850 TABLET ORAL at 18:53

## 2023-10-11 RX ADMIN — ENOXAPARIN SODIUM 40 MILLIGRAM(S): 100 INJECTION SUBCUTANEOUS at 22:44

## 2023-10-11 RX ADMIN — Medication 3 MILLILITER(S): at 18:53

## 2023-10-11 RX ADMIN — BUDESONIDE AND FORMOTEROL FUMARATE DIHYDRATE 2 PUFF(S): 160; 4.5 AEROSOL RESPIRATORY (INHALATION) at 10:27

## 2023-10-11 RX ADMIN — Medication 40 MILLIGRAM(S): at 14:51

## 2023-10-11 RX ADMIN — AZITHROMYCIN 500 MILLIGRAM(S): 500 TABLET, FILM COATED ORAL at 18:53

## 2023-10-11 RX ADMIN — PANTOPRAZOLE SODIUM 40 MILLIGRAM(S): 20 TABLET, DELAYED RELEASE ORAL at 06:55

## 2023-10-11 NOTE — PROGRESS NOTE ADULT - SUBJECTIVE AND OBJECTIVE BOX
**INCOMPLETE NOTE    OVERNIGHT EVENTS:    SUBJECTIVE:  Patient seen and examined at bedside.    Vital Signs Last 12 Hrs  T(F): 97.5 (10-11-23 @ 06:07), Max: 97.9 (10-10-23 @ 22:22)  HR: 96 (10-11-23 @ 06:07) (94 - 96)  BP: 159/87 (10-11-23 @ 06:07) (148/77 - 159/87)  BP(mean): --  RR: 18 (10-11-23 @ 06:07) (18 - 20)  SpO2: 91% (10-11-23 @ 06:07) (91% - 94%)  I&O's Summary      PHYSICAL EXAM:  Constitutional: NAD, comfortable in bed.  HEENT: NC/AT, PERRLA, EOMI, no conjunctival pallor or scleral icterus, MMM  Neck: Supple, no JVD  Respiratory: CTA B/L. No w/r/r.   Cardiovascular: RRR, normal S1 and S2, no m/r/g.   Gastrointestinal: +BS, soft NTND, no guarding or rebound tenderness, no palpable masses   Extremities: wwp; no cyanosis, clubbing or edema.   Vascular: Pulses equal and strong throughout.   Neurological: AAOx3, no CN deficits, strength and sensation intact throughout.   Skin: No gross skin abnormalities or rashes        LABS:                        13.4   10.67 )-----------( 266      ( 10 Oct 2023 05:30 )             41.3     10-10    139  |  104  |  22  ----------------------------<  198<H>  5.2   |  25  |  0.99    Ca    9.5      10 Oct 2023 05:30  Phos  3.8     10-10  Mg     2.2     10-10    TPro  6.7  /  Alb  4.1  /  TBili  0.2  /  DBili  x   /  AST  28  /  ALT  17  /  AlkPhos  102  10-10      Urinalysis Basic - ( 10 Oct 2023 05:30 )    Color: x / Appearance: x / SG: x / pH: x  Gluc: 198 mg/dL / Ketone: x  / Bili: x / Urobili: x   Blood: x / Protein: x / Nitrite: x   Leuk Esterase: x / RBC: x / WBC x   Sq Epi: x / Non Sq Epi: x / Bacteria: x          RADIOLOGY & ADDITIONAL TESTS:    MEDICATIONS  (STANDING):  albuterol/ipratropium for Nebulization 3 milliLiter(s) Nebulizer every 4 hours  amLODIPine   Tablet 10 milliGRAM(s) Oral daily  atorvastatin 40 milliGRAM(s) Oral at bedtime  azithromycin   Tablet 500 milliGRAM(s) Oral every 24 hours  budesonide 160 MICROgram(s)/formoterol 4.5 MICROgram(s) Inhaler 2 Puff(s) Inhalation two times a day  enoxaparin Injectable 40 milliGRAM(s) SubCutaneous every 24 hours  insulin lispro (ADMELOG) corrective regimen sliding scale   SubCutaneous three times a day before meals  insulin lispro (ADMELOG) corrective regimen sliding scale   SubCutaneous at bedtime  levothyroxine 75 MICROGram(s) Oral daily  pantoprazole    Tablet 40 milliGRAM(s) Oral before breakfast  predniSONE   Tablet 40 milliGRAM(s) Oral every 24 hours    MEDICATIONS  (PRN):  acetaminophen     Tablet .. 650 milliGRAM(s) Oral every 6 hours PRN Temp greater or equal to 38C (100.4F), Mild Pain (1 - 3)  aluminum hydroxide/magnesium hydroxide/simethicone Suspension 30 milliLiter(s) Oral every 4 hours PRN Dyspepsia  melatonin 3 milliGRAM(s) Oral at bedtime PRN Insomnia  ondansetron Injectable 4 milliGRAM(s) IV Push every 8 hours PRN Nausea and/or Vomiting   OVERNIGHT EVENTS: Pt resting comfortably. Still maintains she has a cough.     SUBJECTIVE:  Patient seen and examined at bedside.    Vital Signs Last 12 Hrs  T(F): 97.5 (10-11-23 @ 06:07), Max: 97.9 (10-10-23 @ 22:22)  HR: 96 (10-11-23 @ 06:07) (94 - 96)  BP: 159/87 (10-11-23 @ 06:07) (148/77 - 159/87)  BP(mean): --  RR: 18 (10-11-23 @ 06:07) (18 - 20)  SpO2: 91% (10-11-23 @ 06:07) (91% - 94%)  I&O's Summary      PHYSICAL EXAM:  General: Sitting at side of bed  HEENT: NC/AT; PERRL, anicteric sclera; MMM  Neck: supple w/o palpable nodularity  Cardiovascular: +S1/S2; RRR  Respiratory: CTA B/L; b/l wheezing, no retractions  Gastrointestinal: soft, NT/ND; +BSx4  Extremities: WWP; no edema, clubbing or cyanosis  Vascular: 2+ radial, DP/PT pulses B/L  Neurological: AAOx3; no focal deficits  rashes          LABS:                        13.4   10.67 )-----------( 266      ( 10 Oct 2023 05:30 )             41.3     10-10    139  |  104  |  22  ----------------------------<  198<H>  5.2   |  25  |  0.99    Ca    9.5      10 Oct 2023 05:30  Phos  3.8     10-10  Mg     2.2     10-10    TPro  6.7  /  Alb  4.1  /  TBili  0.2  /  DBili  x   /  AST  28  /  ALT  17  /  AlkPhos  102  10-10      Urinalysis Basic - ( 10 Oct 2023 05:30 )    Color: x / Appearance: x / SG: x / pH: x  Gluc: 198 mg/dL / Ketone: x  / Bili: x / Urobili: x   Blood: x / Protein: x / Nitrite: x   Leuk Esterase: x / RBC: x / WBC x   Sq Epi: x / Non Sq Epi: x / Bacteria: x          RADIOLOGY & ADDITIONAL TESTS:    MEDICATIONS  (STANDING):  albuterol/ipratropium for Nebulization 3 milliLiter(s) Nebulizer every 4 hours  amLODIPine   Tablet 10 milliGRAM(s) Oral daily  atorvastatin 40 milliGRAM(s) Oral at bedtime  azithromycin   Tablet 500 milliGRAM(s) Oral every 24 hours  budesonide 160 MICROgram(s)/formoterol 4.5 MICROgram(s) Inhaler 2 Puff(s) Inhalation two times a day  enoxaparin Injectable 40 milliGRAM(s) SubCutaneous every 24 hours  insulin lispro (ADMELOG) corrective regimen sliding scale   SubCutaneous three times a day before meals  insulin lispro (ADMELOG) corrective regimen sliding scale   SubCutaneous at bedtime  levothyroxine 75 MICROGram(s) Oral daily  pantoprazole    Tablet 40 milliGRAM(s) Oral before breakfast  predniSONE   Tablet 40 milliGRAM(s) Oral every 24 hours    MEDICATIONS  (PRN):  acetaminophen     Tablet .. 650 milliGRAM(s) Oral every 6 hours PRN Temp greater or equal to 38C (100.4F), Mild Pain (1 - 3)  aluminum hydroxide/magnesium hydroxide/simethicone Suspension 30 milliLiter(s) Oral every 4 hours PRN Dyspepsia  melatonin 3 milliGRAM(s) Oral at bedtime PRN Insomnia  ondansetron Injectable 4 milliGRAM(s) IV Push every 8 hours PRN Nausea and/or Vomiting

## 2023-10-11 NOTE — DISCHARGE NOTE PROVIDER - NSDCFUADDAPPT_GEN_ALL_CORE_FT
Also follow up with PCP    Taylor Palm [place]   [phone]  10/14/23  *Please call for time when, she should have time later*

## 2023-10-11 NOTE — DISCHARGE NOTE PROVIDER - POSTFACE STATEMENT FOR MINUTES SPENT
Pt plans on traveling to [place] 2/3 for 4-5 weeks    Pt is due to see you Feb 20th, but will need to cancel since he will be gone.     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     Pt would like to have labs done before he leaves.     Please advise, would you like pt to have labs and come see you earlier since his kidney function has been lower?    minutes on the discharge service.

## 2023-10-11 NOTE — DISCHARGE NOTE PROVIDER - ATTENDING DISCHARGE PHYSICAL EXAMINATION:
PE  Gen: pleasant female in NAD  HEENT: EOMI, NCAT  Neck: no jvd no bruits  Lungs: CTA b/l non labored, minimal wheeze present. Good air entry.   CV: RRR S1S2  GI: +BS nontender  LE: no edema  Psych: pleasant, cooperative.

## 2023-10-11 NOTE — DISCHARGE NOTE PROVIDER - HOSPITAL COURSE
#Discharge: do not delete    67F PMHx asthma (no intubations, last admit many yrs ago, last steroids 2 yr ago), lung nodules, active smoker, HTN, HLD, Hypothyroid p/w 3d hx sob/wheezing/dry cough/rhinorrhea/nasal congestion iso asthma exacerbation 2/2 viral respiratory infection      Problem List/Main Diagnoses (system-based):     #COPD exacerbation  Pt was on 7LA before and stepped down to RM[facility]. No intubations, last admit many yrs ago, last steroids 2 yr ago, home med Advair-diskus 500-50 Q12 and Albuterol inhaler PRN. P/w 3 d hx sob/wheezing/dry cough/rhinorrhea/nasal congestion 2/2 viral respiratory infection before admission. Solumedrol, duonebs in ED. Sees Dr. Estuardo Tay (pulmonologist). Patient was initially on 2L of O2 which then she was weaned to RA and was satting well. Patient was on, Duonebs Q4 standing, home advair disku 500-50 Q12 (Therapeutic exchange), Prednisone 40 Qd for 5 day course, mISS and PPI while on Pred. Peak flow was checked daily and was at 200 during her stay. If WOB increased and pt desaturated, pt was to be placed back on Bipap.      #Viral upper respiratory illness.   ED Vitals: T98.2F, , /110, SpO2 90 on RA with RR 26 --> SpO2. ED Labs: WBC 6.5, Hgb 15, Plt 273, CMP WNL except glucose 140. . VBG pCO2 low at 35, PO2 high at 59. Also in ED, patient tested positive for RVP and Amlodipine 10 x1, Mag sulfate 2g x1, Solumedrol 125 IVP x1, Duoneb x1, Albulterol neb x2. Initially placed on bipap for iWOB, weaned to NC then room air. Was Saturating well, there was no iWOB however still wheezing. Patient was then placed back on 2L RA. CXR performed, no overt consolidations noted by author. Procal WNL, less likely bacterial. Pt was then started on azithromycin 500mg x 3 days.       #DM (diabetes mellitus).   Pt with newly diagnosed DM2 while inpatient at [facility]. Was told she had pre-diabetes in the past by PCP and was Rx metformin but never started regimen. In the hospital patient was started on Metformin 500mg BID after it was found her A1c is 7.0. DM educator visited patient.       #HTN (hypertension).   home amlodipine 10 Qd.    #Hypothyroid.   home synthroid 75 Qd.    #Smoking.   1/2 PPD since 18 y/o, total of 48 pack year smoking hx. Pt refusing nicotine replacement therapy at this time. Educated patient on harms of smoking and pros of cessation.    New medications: Metformin, Prednisone,    Labs to be followed outpatient: FBG, CBC, CMP,     Exam to be followed outpatient:    #Discharge: do not delete    67F PMHx asthma (no intubations, last admit many yrs ago, last steroids 2 yr ago), lung nodules, active smoker, HTN, HLD, Hypothyroid p/w 3d hx sob/wheezing/dry cough/rhinorrhea/nasal congestion iso asthma exacerbation 2/2 viral respiratory infection      Problem List/Main Diagnoses (system-based):     #COPD exacerbation  Pt was on 7LA before and stepped down to RMF. No intubations, last admit many yrs ago, last steroids 2 yr ago, home med Advair-diskus 500-50 Q12 and Albuterol inhaler PRN. P/w 3 d hx sob/wheezing/dry cough/rhinorrhea/nasal congestion 2/2 viral respiratory infection before admission. Solumedrol, duonebs in ED. Sees Dr. Estuardo Tay (pulmonologist). Patient was initially on 2L of O2 which then she was weaned to RA and was satting well. Patient was on, Duonebs Q4 standing, home advair disku 500-50 Q12 (Therapeutic exchange), Prednisone 40 Qd for 5 day course, mISS and PPI while on Pred. Peak flow was checked daily and was at 200 during her stay. If WOB increased and pt desaturated, pt was to be placed back on Bipap. Duenobs was then decreased to q6 standing.       #Viral upper respiratory illness.   ED Vitals: T98.2F, , /110, SpO2 90 on RA with RR 26 --> SpO2. ED Labs: WBC 6.5, Hgb 15, Plt 273, CMP WNL except glucose 140. . VBG pCO2 low at 35, PO2 high at 59. Also in ED, patient tested positive for RVP and Amlodipine 10 x1, Mag sulfate 2g x1, Solumedrol 125 IVP x1, Duoneb x1, Albulterol neb x2. Initially placed on bipap for iWOB, weaned to NC then room air. Was Saturating well, there was no iWOB however still wheezing. Patient was then placed back on 2L RA. CXR performed, no overt consolidations noted by author. Procal WNL, less likely bacterial. Pt was then started on azithromycin 500mg x 3 days.       #DM (diabetes mellitus).   Pt with newly diagnosed DM2 while inpatient at [facility]. Was told she had pre-diabetes in the past by PCP and was Rx metformin but never started regimen. In the hospital patient was started on Metformin 500mg BID after it was found her A1c is 7.0. DM educator visited patient.       #HTN (hypertension).   home amlodipine 10 Qd.    #Hypothyroid.   home synthroid 75 Qd.    #Smoking.   1/2 PPD since 20 y/o, total of 48 pack year smoking hx. Pt refusing nicotine replacement therapy at this time. Educated patient on harms of smoking and pros of cessation.    New medications: Metformin, Prednisone,    Labs to be followed outpatient: FBG, CBC, CMP,     Exam to be followed outpatient:    #Discharge: do not delete    67F PMHx asthma (no intubations, last admit many yrs ago, last steroids 2 yr ago), lung nodules, active smoker, HTN, HLD, Hypothyroid p/w 3d hx sob/wheezing/dry cough/rhinorrhea/nasal congestion iso asthma exacerbation 2/2 viral respiratory infection      Problem List/Main Diagnoses (system-based):     #COPD exacerbation  Pt was on 7LA before and stepped down to RMF. No intubations, last admit many yrs ago, last steroids 2 yr ago, home med Advair-diskus 500-50 Q12 and Albuterol inhaler PRN. P/w 3 d hx sob/wheezing/dry cough/rhinorrhea/nasal congestion 2/2 viral respiratory infection before admission. Solumedrol, duonebs in ED. Sees Dr. Estuardo Tay (pulmonologist). Patient was initially on 2L of O2 which then she was weaned to RA and was satting well. Patient was on, Duonebs Q4 standing, home advair disku 500-50 Q12 (Therapeutic exchange), Prednisone 40 Qd for 5 day course, mISS and PPI while on Pred. Peak flow was checked daily and was at 200 during her stay. If WOB increased and pt desaturated, pt was to be placed back on Bipap. Duenobs was then decreased to q6 standing.       #Viral upper respiratory illness.   ED Vitals: T98.2F, , /110, SpO2 90 on RA with RR 26 --> SpO2. ED Labs: WBC 6.5, Hgb 15, Plt 273, CMP WNL except glucose 140. . VBG pCO2 low at 35, PO2 high at 59. Also in ED, patient tested positive for RVP and Amlodipine 10 x1, Mag sulfate 2g x1, Solumedrol 125 IVP x1, Duoneb x1, Albulterol neb x2. Initially placed on bipap for iWOB, weaned to NC then room air. Was Saturating well, there was no iWOB however still wheezing. Patient was then placed back on 2L RA. CXR performed, no overt consolidations noted by author. Procal WNL, less likely bacterial. Pt was then started on azithromycin 500mg x 3 days.       #DM (diabetes mellitus).   Pt with newly diagnosed DM2 while inpatient at [facility]. Was told she had pre-diabetes in the past by PCP and was Rx metformin but never started regimen. In the hospital patient was started on Metformin 500mg BID after it was found her A1c is 7.0. DM educator visited patient.       #HTN (hypertension).   home amlodipine 10 Qd.    #Hypothyroid.   home synthroid 75 Qd.    #Smoking.   1/2 PPD since 20 y/o, total of 48 pack year smoking hx. Pt refusing nicotine replacement therapy at this time. Educated patient on harms of smoking and pros of cessation.    New medications: Metformin, Prednisone, Symbicort    Labs to be followed outpatient: FBG, CBC, CMP,     Exam to be followed outpatient:    #Discharge: do not delete    67F PMHx asthma (no intubations, last admit many yrs ago, last steroids 2 yr ago), lung nodules, active smoker, HTN, HLD, Hypothyroid p/w 3d hx sob/wheezing/dry cough/rhinorrhea/nasal congestion iso asthma exacerbation 2/2 viral respiratory infection      Problem List/Main Diagnoses (system-based):     #COPD exacerbation  Pt was on 7LA before and stepped down to RMF. No intubations, last admit many yrs ago, last steroids 2 yr ago, home med Advair-diskus 500-50 Q12 and Albuterol inhaler PRN. P/w 3 d hx sob/wheezing/dry cough/rhinorrhea/nasal congestion 2/2 viral respiratory infection before admission. Solumedrol, duonebs in ED. Sees Dr. Estuardo Tay (pulmonologist). Patient was initially on 2L of O2 which then she was weaned to RA and was satting well. Patient was on, Duonebs Q4 standing, home advair disku 500-50 Q12 (Therapeutic exchange), Prednisone 40 Qd for 5 day course, mISS and PPI while on Pred. Peak flow was checked daily and was at 200 during her stay. If WOB increased and pt desaturated, pt was to be placed back on Bipap. Duenobs was then decreased to q6 standing. Wheezing now improved and saturating well on room air.       #Viral upper respiratory illness.   ED Vitals: T98.2F, , /110, SpO2 90 on RA with RR 26 --> SpO2. ED Labs: WBC 6.5, Hgb 15, Plt 273, CMP WNL except glucose 140. . VBG pCO2 low at 35, PO2 high at 59. Also in ED, patient tested positive for RVP and Amlodipine 10 x1, Mag sulfate 2g x1, Solumedrol 125 IVP x1, Duoneb x1, Albulterol neb x2. Initially placed on bipap for iWOB, weaned to NC then room air. Was Saturating well, there was no iWOB however still wheezing. Patient was then placed back on 2L RA. CXR performed, no overt consolidations noted by author. Procal WNL, less likely bacterial. Pt was then started on azithromycin 500mg x 3 days for antiinflammatory effects.      #DM (diabetes mellitus).   Pt with newly diagnosed DM2 while inpatient at [facility]. Was told she had pre-diabetes in the past by PCP and was Rx metformin but never started regimen. In the hospital patient was started on Metformin 500mg BID after it was found her A1c is 7.0. DM educator visited patient.       #HTN (hypertension).   home amlodipine 10 Qd.    #Hypothyroid.   home synthroid 75 Qd.    #Smoking.   1/2 PPD since 20 y/o, total of 48 pack year smoking hx. Pt refusing nicotine replacement therapy at this time. Educated patient on harms of smoking and pros of cessation.    New medications: Metformin, Prednisone, Symbicort    Labs to be followed outpatient: FBG, CBC, CMP,     Exam to be followed outpatient:

## 2023-10-11 NOTE — PROGRESS NOTE ADULT - ATTENDING COMMENTS
Pt comfortable on NC. End exp wheeze note but speaking in full sentences. continue care as outlined.
#Acute hypoxemic respitratory failure 2/2 Sepsis, POA now improving, due to viral infxn  #Asthma exacerbation  #Current smoker, smoking cessation  #DM2, new onset  #Hypothryoid, HLD, HTN        -2/2 viral infxn and setting of current smoker. Continues to improve. Monitor peak flow. F/u with pulm outpatient.   -Check peak flow daily. Will give course of azithro. Needs to see pulm on outpatient f/u. Smoking cessation discussed in great length.   -As above.   -Confirmed no known history, was previously preDM. Will get DM educator while inpatient. Will start with metformin. Outpatient f/u.   -c/w home medications.        Dispo: likely DC tomorrow
#Asthma exacerbation: 2/2 rhinovirus. Now improved, comfortable on RA. c/w DuoNeb's, prednisone q5days, Symbicort, trend peak flow. continue to monitor resp status.
#Acute hypoxemic respitratory failure 2/2 Sepsis, POA now improving, due to viral infxn  #Asthma exacerbation  #Current smoker, smoking cessation  #DM2  #Hypothryoid, HLD, HTN        -2/2 viral infxn and setting of current smoker, sick contact grandsons who were recnetly ill. Weaned off BIPAP now back on RA. Was septic on admission now improving.   -Check peak flow daily. Will give course of azithro. Needs to see pulm on outpatient f/u. Smoking cessation discussed in great length.   -As above.   -Unclear is history of. WIll make sure she is dc'd on medication w/ follow up with her PCP  -c/w home medications.

## 2023-10-11 NOTE — DISCHARGE NOTE PROVIDER - PROVIDER TOKENS
FREE:[LAST:[Iggy],FIRST:[Estuardo],PHONE:[phone]],FAX:[phone]   [phone]    -],ADDRESS:[[address] [place] [address]],SCHEDULEDAPPT:[11/22/2023],SCHEDULEDAPPTTIME:[04:00 PM]]

## 2023-10-11 NOTE — PROGRESS NOTE ADULT - PROBLEM SELECTOR PLAN 5
C/w home amlodipine 10 Qd
C/w home synthroid 75 Qd

## 2023-10-11 NOTE — PROGRESS NOTE ADULT - PROBLEM SELECTOR PLAN 7
1/2 PPD since 20 y/o, total of 48 pack year smoking hx  - Pt refusing nicotine replacement therapy at this time  - Educated patient on harms of smoking and pros of cessation
F: None  E: Replete PRN  N: Regular diet  DVT ppx: Heparin subq  Code: Full

## 2023-10-11 NOTE — PROGRESS NOTE ADULT - PROBLEM SELECTOR PLAN 4
C/w home amlodipine 10 Qd
-Pt with newly diagnosed DM2. Was told she had pre-diabetes in the past by PCP and was Rx metformin but never started regimen   -A1c: 7.0  -Will start Metformin 500mg BID  -DM educator informed
C/w home amlodipine 10 Qd
C/w home amlodipine 10 Qd

## 2023-10-11 NOTE — DISCHARGE NOTE PROVIDER - NSDCCPCAREPLAN_GEN_ALL_CORE_FT
PRINCIPAL DISCHARGE DIAGNOSIS  Diagnosis: Acute asthma exacerbation  Assessment and Plan of Treatment: Asthma is a condition that can make it hard to breathe. Asthma does not always cause symptoms. But when symptoms occur, they can be scary. Asthma attacks happen when the airways in the lungs become narrow and inflamed. Asthma symptoms can include wheezing or noisy breathing, coughing, a tight feeling in the chest and shortness of breath. You can help prevent your asthma symptoms by staying away from things that may cause your symptoms or make them worse. Some common triggers include dust, mold, pets, pollen, cigarette smoke, getting sick with a cold or flu and stress. It is important that you take your asthma medications as they are prescribe to prevent further exacerbations and help with symptoms during an asthma attack. You should see your doctor if you have an asthma attack and the symptoms do not improve or get worse after using a quick-relief medicine. If the symptoms are severe, call for an ambulance. In your case, your asthma, COPD and Rhinovirus made your lung function worse which brought you to the hospital. Please follow up with your Pulmonologist outpatient. Rhinovbirus is one of the many viruses that cause the common cold. Please take all medications as directed upon your discharge and rest for a couple of days.         SECONDARY DISCHARGE DIAGNOSES  Diagnosis: DM (diabetes mellitus)  Assessment and Plan of Treatment: You have a knew diagnosis of diabetes mellitus during your admission. This condition results from blood sugar levels getting too high because your body is more resistant to insulin. Uncontrolled blood sugar levels can lead to kidney and heart damage, pain/numbness/paralysis in your hands and feet, and increased rates of infections.  To manage this you are on a medication called metformin. It is important that you continue to take this medication when you are discharged so that you can continue to control your blood sugar levels. Additionally be sure to follow up with your primary care physician, podiatrist, and ophthalmologist on a regular basis and communicate your diagnosis to your relevant doctors

## 2023-10-11 NOTE — PROGRESS NOTE ADULT - PROBLEM SELECTOR PROBLEM 3
Viral upper respiratory illness

## 2023-10-11 NOTE — DISCHARGE NOTE PROVIDER - CARE PROVIDER_API CALL
Estuardo Parkinson  [address] [place] [address]  Phone: [phone]  Fax: (   )    -  Scheduled Appointment: 11/22/2023 04:00 PM

## 2023-10-11 NOTE — DISCHARGE NOTE PROVIDER - NSDCMRMEDTOKEN_GEN_ALL_CORE_FT
Albuterol (Eqv-ProAir HFA) 90 mcg/inh inhalation aerosol: 2 puff(s) inhaled  alcohol swabs: Apply topically to affected area 4 times a day  amLODIPine 10 mg oral tablet: 1 tab(s) orally once a day  aspirin 81 mg oral capsule: 1 cap(s) orally  atorvastatin 40 mg oral tablet: 1 tab(s) orally  fluticasone-salmeterol 500 mcg-50 mcg inhalation powder: 1 application inhaled 2 times a day  glucometer (per patient&#x27;s insurance): Test blood sugars four times a day. Dispense #1 glucometer.  lancets: 1 application subcutaneously 4 times a day  loratadine 10 mg oral tablet: 1 tab(s) orally  omeprazole 40 mg oral delayed release capsule: 1 cap(s) orally  Synthroid 75 mcg (0.075 mg) oral tablet: 1 tab(s) orally once a day  test strips (per patient&#x27;s insurance): 1 application subcutaneously 4 times a day. ** Compatible with patient&#x27;s glucometer **   Albuterol (Eqv-ProAir HFA) 90 mcg/inh inhalation aerosol: 2 puff(s) inhaled  alcohol swabs: Apply topically to affected area 4 times a day  amLODIPine 10 mg oral tablet: 1 tab(s) orally once a day  aspirin 81 mg oral capsule: 1 cap(s) orally  azithromycin 500 mg oral tablet: 1 tab(s) orally every 24 hours  fluticasone-salmeterol 500 mcg-50 mcg inhalation powder: 1 application inhaled 2 times a day  glucometer (per patient&#x27;s insurance): Test blood sugars four times a day. Dispense #1 glucometer.  lancets: 1 application subcutaneously 4 times a day  Lipitor 40 mg oral tablet: 1 tab(s) orally once a day (at bedtime)  loratadine 10 mg oral tablet: 1 tab(s) orally  metFORMIN 500 mg oral tablet: 1 tab(s) orally every 12 hours  omeprazole 40 mg oral delayed release capsule: 1 cap(s) orally  predniSONE 20 mg oral tablet: 2 tab(s) orally every 24 hours  Synthroid 75 mcg (0.075 mg) oral tablet: 1 tab(s) orally once a day  test strips (per patient&#x27;s insurance): 1 application subcutaneously 4 times a day. ** Compatible with patient&#x27;s glucometer **

## 2023-10-11 NOTE — PROGRESS NOTE ADULT - PROBLEM SELECTOR PLAN 3
RVP positive for rhinovirus. Covid negative. CXR performed pending read, no overt consolidations noted by author. Pt w/o purulent sputum, WBC WNL, afebrile.   Procal WNL, less likely bacterial   - Address COPD/Asthma exacerbation as above  - azithromycin 500mg x 3 days started

## 2023-10-12 VITALS — WEIGHT: 173.06 LBS

## 2023-10-12 LAB
ALBUMIN SERPL ELPH-MCNC: 4.5 G/DL — SIGNIFICANT CHANGE UP (ref 3.3–5)
ALP SERPL-CCNC: 112 U/L — SIGNIFICANT CHANGE UP (ref 40–120)
ALT FLD-CCNC: 25 U/L — SIGNIFICANT CHANGE UP (ref 10–45)
ANION GAP SERPL CALC-SCNC: 13 MMOL/L — SIGNIFICANT CHANGE UP (ref 5–17)
APTT BLD: 31.3 SEC — SIGNIFICANT CHANGE UP (ref 24.5–35.6)
AST SERPL-CCNC: 21 U/L — SIGNIFICANT CHANGE UP (ref 10–40)
BASOPHILS # BLD AUTO: 0.08 K/UL — SIGNIFICANT CHANGE UP (ref 0–0.2)
BASOPHILS NFR BLD AUTO: 0.8 % — SIGNIFICANT CHANGE UP (ref 0–2)
BILIRUB SERPL-MCNC: 0.2 MG/DL — SIGNIFICANT CHANGE UP (ref 0.2–1.2)
BUN SERPL-MCNC: 18 MG/DL — SIGNIFICANT CHANGE UP (ref 7–23)
CALCIUM SERPL-MCNC: 9.6 MG/DL — SIGNIFICANT CHANGE UP (ref 8.4–10.5)
CHLORIDE SERPL-SCNC: 103 MMOL/L — SIGNIFICANT CHANGE UP (ref 96–108)
CO2 SERPL-SCNC: 25 MMOL/L — SIGNIFICANT CHANGE UP (ref 22–31)
CREAT SERPL-MCNC: 0.92 MG/DL — SIGNIFICANT CHANGE UP (ref 0.5–1.3)
EGFR: 69 ML/MIN/1.73M2 — SIGNIFICANT CHANGE UP
EOSINOPHIL # BLD AUTO: 0 K/UL — SIGNIFICANT CHANGE UP (ref 0–0.5)
EOSINOPHIL NFR BLD AUTO: 0 % — SIGNIFICANT CHANGE UP (ref 0–6)
GLUCOSE BLDC GLUCOMTR-MCNC: 144 MG/DL — HIGH (ref 70–99)
GLUCOSE SERPL-MCNC: 144 MG/DL — HIGH (ref 70–99)
HCT VFR BLD CALC: 44.7 % — SIGNIFICANT CHANGE UP (ref 34.5–45)
HGB BLD-MCNC: 14.7 G/DL — SIGNIFICANT CHANGE UP (ref 11.5–15.5)
IMM GRANULOCYTES NFR BLD AUTO: 3.1 % — HIGH (ref 0–0.9)
INR BLD: 0.93 — SIGNIFICANT CHANGE UP (ref 0.85–1.18)
LYMPHOCYTES # BLD AUTO: 2.31 K/UL — SIGNIFICANT CHANGE UP (ref 1–3.3)
LYMPHOCYTES # BLD AUTO: 23.1 % — SIGNIFICANT CHANGE UP (ref 13–44)
MAGNESIUM SERPL-MCNC: 2.2 MG/DL — SIGNIFICANT CHANGE UP (ref 1.6–2.6)
MCHC RBC-ENTMCNC: 31 PG — SIGNIFICANT CHANGE UP (ref 27–34)
MCHC RBC-ENTMCNC: 32.9 GM/DL — SIGNIFICANT CHANGE UP (ref 32–36)
MCV RBC AUTO: 94.3 FL — SIGNIFICANT CHANGE UP (ref 80–100)
MONOCYTES # BLD AUTO: 1.23 K/UL — HIGH (ref 0–0.9)
MONOCYTES NFR BLD AUTO: 12.3 % — SIGNIFICANT CHANGE UP (ref 2–14)
NEUTROPHILS # BLD AUTO: 6.05 K/UL — SIGNIFICANT CHANGE UP (ref 1.8–7.4)
NEUTROPHILS NFR BLD AUTO: 60.7 % — SIGNIFICANT CHANGE UP (ref 43–77)
NRBC # BLD: 0 /100 WBCS — SIGNIFICANT CHANGE UP (ref 0–0)
PHOSPHATE SERPL-MCNC: 4.1 MG/DL — SIGNIFICANT CHANGE UP (ref 2.5–4.5)
PLATELET # BLD AUTO: 276 K/UL — SIGNIFICANT CHANGE UP (ref 150–400)
POTASSIUM SERPL-MCNC: 4.6 MMOL/L — SIGNIFICANT CHANGE UP (ref 3.5–5.3)
POTASSIUM SERPL-SCNC: 4.6 MMOL/L — SIGNIFICANT CHANGE UP (ref 3.5–5.3)
PROT SERPL-MCNC: 7.5 G/DL — SIGNIFICANT CHANGE UP (ref 6–8.3)
PROTHROM AB SERPL-ACNC: 10.6 SEC — SIGNIFICANT CHANGE UP (ref 9.5–13)
RBC # BLD: 4.74 M/UL — SIGNIFICANT CHANGE UP (ref 3.8–5.2)
RBC # FLD: 13.7 % — SIGNIFICANT CHANGE UP (ref 10.3–14.5)
SODIUM SERPL-SCNC: 141 MMOL/L — SIGNIFICANT CHANGE UP (ref 135–145)
WBC # BLD: 9.98 K/UL — SIGNIFICANT CHANGE UP (ref 3.8–10.5)
WBC # FLD AUTO: 9.98 K/UL — SIGNIFICANT CHANGE UP (ref 3.8–10.5)

## 2023-10-12 PROCEDURE — 83036 HEMOGLOBIN GLYCOSYLATED A1C: CPT

## 2023-10-12 PROCEDURE — 85610 PROTHROMBIN TIME: CPT

## 2023-10-12 PROCEDURE — 80048 BASIC METABOLIC PNL TOTAL CA: CPT

## 2023-10-12 PROCEDURE — 96365 THER/PROPH/DIAG IV INF INIT: CPT

## 2023-10-12 PROCEDURE — 94640 AIRWAY INHALATION TREATMENT: CPT

## 2023-10-12 PROCEDURE — 96375 TX/PRO/DX INJ NEW DRUG ADDON: CPT

## 2023-10-12 PROCEDURE — 87040 BLOOD CULTURE FOR BACTERIA: CPT

## 2023-10-12 PROCEDURE — 83735 ASSAY OF MAGNESIUM: CPT

## 2023-10-12 PROCEDURE — 94660 CPAP INITIATION&MGMT: CPT

## 2023-10-12 PROCEDURE — 93005 ELECTROCARDIOGRAM TRACING: CPT

## 2023-10-12 PROCEDURE — 82330 ASSAY OF CALCIUM: CPT

## 2023-10-12 PROCEDURE — 83880 ASSAY OF NATRIURETIC PEPTIDE: CPT

## 2023-10-12 PROCEDURE — 99291 CRITICAL CARE FIRST HOUR: CPT

## 2023-10-12 PROCEDURE — 84484 ASSAY OF TROPONIN QUANT: CPT

## 2023-10-12 PROCEDURE — 99239 HOSP IP/OBS DSCHRG MGMT >30: CPT | Mod: GC

## 2023-10-12 PROCEDURE — 82962 GLUCOSE BLOOD TEST: CPT

## 2023-10-12 PROCEDURE — 84295 ASSAY OF SERUM SODIUM: CPT

## 2023-10-12 PROCEDURE — 84100 ASSAY OF PHOSPHORUS: CPT

## 2023-10-12 PROCEDURE — 82803 BLOOD GASES ANY COMBINATION: CPT

## 2023-10-12 PROCEDURE — 85025 COMPLETE CBC W/AUTO DIFF WBC: CPT

## 2023-10-12 PROCEDURE — 84132 ASSAY OF SERUM POTASSIUM: CPT

## 2023-10-12 PROCEDURE — 71045 X-RAY EXAM CHEST 1 VIEW: CPT

## 2023-10-12 PROCEDURE — 0225U NFCT DS DNA&RNA 21 SARSCOV2: CPT

## 2023-10-12 PROCEDURE — 86803 HEPATITIS C AB TEST: CPT

## 2023-10-12 PROCEDURE — 85027 COMPLETE CBC AUTOMATED: CPT

## 2023-10-12 PROCEDURE — 36415 COLL VENOUS BLD VENIPUNCTURE: CPT

## 2023-10-12 PROCEDURE — 80053 COMPREHEN METABOLIC PANEL: CPT

## 2023-10-12 PROCEDURE — 84145 PROCALCITONIN (PCT): CPT

## 2023-10-12 PROCEDURE — 85730 THROMBOPLASTIN TIME PARTIAL: CPT

## 2023-10-12 RX ORDER — FLUTICASONE PROPIONATE AND SALMETEROL 50; 250 UG/1; UG/1
1 POWDER ORAL; RESPIRATORY (INHALATION)
Qty: 1 | Refills: 0
Start: 2023-10-12 | End: 2023-11-10

## 2023-10-12 RX ORDER — ATORVASTATIN CALCIUM 80 MG/1
1 TABLET, FILM COATED ORAL
Qty: 30 | Refills: 0
Start: 2023-10-12 | End: 2023-11-10

## 2023-10-12 RX ORDER — AZITHROMYCIN 500 MG/1
1 TABLET, FILM COATED ORAL
Qty: 1 | Refills: 0
Start: 2023-10-12 | End: 2023-10-12

## 2023-10-12 RX ORDER — METFORMIN HYDROCHLORIDE 850 MG/1
1 TABLET ORAL
Qty: 60 | Refills: 0
Start: 2023-10-12 | End: 2023-11-10

## 2023-10-12 RX ADMIN — AMLODIPINE BESYLATE 10 MILLIGRAM(S): 2.5 TABLET ORAL at 06:14

## 2023-10-12 RX ADMIN — METFORMIN HYDROCHLORIDE 500 MILLIGRAM(S): 850 TABLET ORAL at 06:14

## 2023-10-12 RX ADMIN — PANTOPRAZOLE SODIUM 40 MILLIGRAM(S): 20 TABLET, DELAYED RELEASE ORAL at 06:15

## 2023-10-12 RX ADMIN — Medication 75 MICROGRAM(S): at 06:14

## 2023-10-12 RX ADMIN — BUDESONIDE AND FORMOTEROL FUMARATE DIHYDRATE 2 PUFF(S): 160; 4.5 AEROSOL RESPIRATORY (INHALATION) at 09:36

## 2023-10-12 RX ADMIN — Medication 3 MILLILITER(S): at 06:15

## 2023-10-12 NOTE — DISCHARGE NOTE NURSING/CASE MANAGEMENT/SOCIAL WORK - NSTRANSFERBELONGINGSDISPO_GEN_A_NUR
not applicable Post-Care Instructions: I reviewed with the patient in detail post-care instructions. Patient is to avoid sunlight for the next 2 days, and wear sun protection. Patients may expect sunburn like redness, discomfort and scabbing.

## 2023-10-12 NOTE — DIETITIAN INITIAL EVALUATION ADULT - PERTINENT LABORATORY DATA
10-12    141  |  103  |  18  ----------------------------<  144<H>  4.6   |  25  |  0.92    Ca    9.6      12 Oct 2023 09:24  Phos  4.1     10-12  Mg     2.2     10-12    TPro  7.5  /  Alb  4.5  /  TBili  0.2  /  DBili  x   /  AST  21  /  ALT  25  /  AlkPhos  112  10-12  POCT Blood Glucose.: 144 mg/dL (10-12-23 @ 09:17)  A1C with Estimated Average Glucose Result: 7.0 % (10-09-23 @ 05:30)

## 2023-10-12 NOTE — DIETITIAN INITIAL EVALUATION ADULT - PERTINENT MEDS FT
MEDICATIONS  (STANDING):  albuterol/ipratropium for Nebulization 3 milliLiter(s) Nebulizer every 6 hours  amLODIPine   Tablet 10 milliGRAM(s) Oral daily  atorvastatin 40 milliGRAM(s) Oral at bedtime  azithromycin   Tablet 500 milliGRAM(s) Oral every 24 hours  budesonide 160 MICROgram(s)/formoterol 4.5 MICROgram(s) Inhaler 2 Puff(s) Inhalation two times a day  enoxaparin Injectable 40 milliGRAM(s) SubCutaneous every 24 hours  insulin lispro (ADMELOG) corrective regimen sliding scale   SubCutaneous three times a day before meals  insulin lispro (ADMELOG) corrective regimen sliding scale   SubCutaneous at bedtime  levothyroxine 75 MICROGram(s) Oral daily  metFORMIN 500 milliGRAM(s) Oral every 12 hours  pantoprazole    Tablet 40 milliGRAM(s) Oral before breakfast  predniSONE   Tablet 40 milliGRAM(s) Oral every 24 hours    MEDICATIONS  (PRN):  acetaminophen     Tablet .. 650 milliGRAM(s) Oral every 6 hours PRN Temp greater or equal to 38C (100.4F), Mild Pain (1 - 3)  aluminum hydroxide/magnesium hydroxide/simethicone Suspension 30 milliLiter(s) Oral every 4 hours PRN Dyspepsia  melatonin 3 milliGRAM(s) Oral at bedtime PRN Insomnia  ondansetron Injectable 4 milliGRAM(s) IV Push every 8 hours PRN Nausea and/or Vomiting

## 2023-10-12 NOTE — DIETITIAN INITIAL EVALUATION ADULT - PROBLEM SELECTOR PLAN 3
RVP positive for rhinovirus. Covid negative. CXR performed pending read, no overt consolidations noted by author. Pt w/o purulent sputum, WBC WNL, afebrile.     - Address COPD/Asthma exacerbation as above  - Monitor off abx for now iso likely viral etiology

## 2023-10-12 NOTE — DIETITIAN INITIAL EVALUATION ADULT - OTHER INFO
67F PMHx asthma (no intubations, last admit many yrs ago, last steroids 2 yr ago), lung nodules, active smoker, HTN, HLD, Hypothyroid p/w 3d hx sob/wheezing/dry cough/rhinorrhea/nasal congestion, pt was sent from urgent care for resp distress and ra sat 87%.  Pt given 1 duoneb and 40 mg prednisone and has been using home mdi and neb machine w/o relief.  Grandson sick w similar sx, potentailly viral etiology.     Patient seen at bedside for length of stay assessment. Current diet order: regular. Allergic to peanuts. No difficulty chewing/swallowing reported. Reports appetite is coming back after ~1 week of decreased appetite/PO intake. Patient consumed fruit and eggs at breakfast. Patient reports that she was previously told to start metformin for blood sugar control but she did not because she didn't like the side affects. She reports she began making smoothies which helped bring down her sugar levels at her next doctors visit and reports sugars are back up again now. Patient reports she eats potatoes and rice frequently, enjoys sweets such as cookies and ice cream, and observed large bottle of Pepsi at bedside. Provided nutrition education discussing consistent carbohydrate diet, importance of adequate protein, portion control of carbohydrates, limiting soda intake, switching to sugar-free desserts. Patient verbalized understanding. Reports she does not want to end up on insulin or need an amputation in the future, says she will start taking oral DM medication. No dosing height entered in EMR, reports she is 5'7". Dosing weight: 173 pounds. No pressure injuries documented. No edema documented. Reports she has not had a BM since 10/8. Elevated blood sugar, HgbA1c 7.0. Meds: antibiotic, metformin, insulin, prednisone. Observed with no overt signs and symptoms of muscle or fat wasting. Based on ASPEN guidelines, pt does not meet criteria for malnutrition. No cultural, ethnic, Baptism food preferences reported. See nutrition recommendations below.

## 2023-10-12 NOTE — DIETITIAN INITIAL EVALUATION ADULT - NSPROEDALEARNPREF_GEN_A_NUR
consistent carbohydrate diet, importance of adequate protein, portion control of carbohydrates, limiting soda intake, switching to sugar-free desserts/verbal instruction

## 2023-10-12 NOTE — DIETITIAN INITIAL EVALUATION ADULT - OTHER CALCULATIONS
Based on Standards of Care pt above % IBW (128%) thus ideal body weight used for all calculations (135#). Needs adjusted for advanced age.

## 2023-10-12 NOTE — DISCHARGE NOTE NURSING/CASE MANAGEMENT/SOCIAL WORK - PATIENT PORTAL LINK FT
You can access the FollowMyHealth Patient Portal offered by [facility] by registering at the following website: [email or web address]. By joining [employer]’s FollowMyHealth portal, you will also be able to view your health information using other applications (apps) compatible with our system.

## 2023-10-12 NOTE — DISCHARGE NOTE NURSING/CASE MANAGEMENT/SOCIAL WORK - NSDCFUADDAPPT_GEN_ALL_CORE_FT
Also follow up with PCP    Taylor Palm [place]   [phone]  10/14/23  *Please call for time when, she should have time later*

## 2023-10-12 NOTE — DIETITIAN INITIAL EVALUATION ADULT - ADD RECOMMEND
1. Recommend to change diet to consistent carbohydrates  2. Encourage pt to meet nutritional needs as able   3. Monitor labs: electrolytes, cmp, fingersticks  4. Monitor weights   5. Pain and bowel regimen per team   6. Will continue to assess/honor food preferences as able  7. Monitor adherence to diet education  right

## 2023-10-13 LAB
CULTURE RESULTS: SIGNIFICANT CHANGE UP
SPECIMEN SOURCE: SIGNIFICANT CHANGE UP

## 2023-10-19 DIAGNOSIS — B34.8 OTHER VIRAL INFECTIONS OF UNSPECIFIED SITE: ICD-10-CM

## 2023-10-19 DIAGNOSIS — J44.1 CHRONIC OBSTRUCTIVE PULMONARY DISEASE WITH (ACUTE) EXACERBATION: ICD-10-CM

## 2023-10-19 DIAGNOSIS — I10 ESSENTIAL (PRIMARY) HYPERTENSION: ICD-10-CM

## 2023-10-19 DIAGNOSIS — R65.20 SEVERE SEPSIS WITHOUT SEPTIC SHOCK: ICD-10-CM

## 2023-10-19 DIAGNOSIS — J06.9 ACUTE UPPER RESPIRATORY INFECTION, UNSPECIFIED: ICD-10-CM

## 2023-10-19 DIAGNOSIS — E03.9 HYPOTHYROIDISM, UNSPECIFIED: ICD-10-CM

## 2023-10-19 DIAGNOSIS — B34.1 ENTEROVIRUS INFECTION, UNSPECIFIED: ICD-10-CM

## 2023-10-19 DIAGNOSIS — K21.9 GASTRO-ESOPHAGEAL REFLUX DISEASE WITHOUT ESOPHAGITIS: ICD-10-CM

## 2023-10-19 DIAGNOSIS — E11.9 TYPE 2 DIABETES MELLITUS WITHOUT COMPLICATIONS: ICD-10-CM

## 2023-10-19 DIAGNOSIS — J96.01 ACUTE RESPIRATORY FAILURE WITH HYPOXIA: ICD-10-CM

## 2023-10-19 DIAGNOSIS — J98.11 ATELECTASIS: ICD-10-CM

## 2023-10-19 DIAGNOSIS — E78.5 HYPERLIPIDEMIA, UNSPECIFIED: ICD-10-CM

## 2023-10-19 DIAGNOSIS — F17.200 NICOTINE DEPENDENCE, UNSPECIFIED, UNCOMPLICATED: ICD-10-CM

## 2023-10-19 DIAGNOSIS — A41.89 OTHER SPECIFIED SEPSIS: ICD-10-CM
